# Patient Record
Sex: FEMALE | Race: WHITE | NOT HISPANIC OR LATINO | Employment: OTHER | URBAN - METROPOLITAN AREA
[De-identification: names, ages, dates, MRNs, and addresses within clinical notes are randomized per-mention and may not be internally consistent; named-entity substitution may affect disease eponyms.]

---

## 2017-01-27 ENCOUNTER — HOSPITAL ENCOUNTER (OUTPATIENT)
Dept: RADIOLOGY | Facility: MEDICAL CENTER | Age: 47
End: 2017-01-27
Attending: NURSE PRACTITIONER
Payer: COMMERCIAL

## 2017-01-27 DIAGNOSIS — Z12.31 ENCOUNTER FOR SCREENING MAMMOGRAM FOR BREAST CANCER: ICD-10-CM

## 2017-01-27 PROCEDURE — 77063 BREAST TOMOSYNTHESIS BI: CPT

## 2017-02-21 RX ORDER — TRAZODONE HYDROCHLORIDE 50 MG/1
TABLET ORAL
Qty: 30 TAB | Refills: 2 | Status: SHIPPED | OUTPATIENT
Start: 2017-02-21 | End: 2018-04-24

## 2017-07-10 DIAGNOSIS — R00.2 HEART PALPITATIONS: ICD-10-CM

## 2017-07-12 ENCOUNTER — HOSPITAL ENCOUNTER (OUTPATIENT)
Dept: LAB | Facility: MEDICAL CENTER | Age: 47
End: 2017-07-12
Attending: NURSE PRACTITIONER
Payer: COMMERCIAL

## 2017-07-12 DIAGNOSIS — R00.2 HEART PALPITATIONS: ICD-10-CM

## 2017-07-12 DIAGNOSIS — Z86.39 HISTORY OF IRON DEFICIENCY: ICD-10-CM

## 2017-07-12 DIAGNOSIS — E78.49 OTHER HYPERLIPIDEMIA: ICD-10-CM

## 2017-07-12 LAB
ANION GAP SERPL CALC-SCNC: 7 MMOL/L (ref 0–11.9)
BASOPHILS # BLD AUTO: 0.8 % (ref 0–1.8)
BASOPHILS # BLD: 0.04 K/UL (ref 0–0.12)
BUN SERPL-MCNC: 19 MG/DL (ref 8–22)
CALCIUM SERPL-MCNC: 9.1 MG/DL (ref 8.5–10.5)
CHLORIDE SERPL-SCNC: 107 MMOL/L (ref 96–112)
CHOLEST SERPL-MCNC: 201 MG/DL (ref 100–199)
CO2 SERPL-SCNC: 24 MMOL/L (ref 20–33)
CREAT SERPL-MCNC: 0.82 MG/DL (ref 0.5–1.4)
EOSINOPHIL # BLD AUTO: 0.13 K/UL (ref 0–0.51)
EOSINOPHIL NFR BLD: 2.5 % (ref 0–6.9)
ERYTHROCYTE [DISTWIDTH] IN BLOOD BY AUTOMATED COUNT: 43.2 FL (ref 35.9–50)
GFR SERPL CREATININE-BSD FRML MDRD: >60 ML/MIN/1.73 M 2
GLUCOSE SERPL-MCNC: 81 MG/DL (ref 65–99)
HCT VFR BLD AUTO: 42.9 % (ref 37–47)
HDLC SERPL-MCNC: 71 MG/DL
HGB BLD-MCNC: 13.9 G/DL (ref 12–16)
IMM GRANULOCYTES # BLD AUTO: 0.01 K/UL (ref 0–0.11)
IMM GRANULOCYTES NFR BLD AUTO: 0.2 % (ref 0–0.9)
IRON SATN MFR SERPL: 22 % (ref 15–55)
IRON SERPL-MCNC: 96 UG/DL (ref 40–170)
LDLC SERPL CALC-MCNC: 111 MG/DL
LYMPHOCYTES # BLD AUTO: 1.76 K/UL (ref 1–4.8)
LYMPHOCYTES NFR BLD: 34.5 % (ref 22–41)
MCH RBC QN AUTO: 29.5 PG (ref 27–33)
MCHC RBC AUTO-ENTMCNC: 32.4 G/DL (ref 33.6–35)
MCV RBC AUTO: 91.1 FL (ref 81.4–97.8)
MONOCYTES # BLD AUTO: 0.4 K/UL (ref 0–0.85)
MONOCYTES NFR BLD AUTO: 7.8 % (ref 0–13.4)
NEUTROPHILS # BLD AUTO: 2.76 K/UL (ref 2–7.15)
NEUTROPHILS NFR BLD: 54.2 % (ref 44–72)
NRBC # BLD AUTO: 0 K/UL
NRBC BLD AUTO-RTO: 0 /100 WBC
PLATELET # BLD AUTO: 174 K/UL (ref 164–446)
PMV BLD AUTO: 12.4 FL (ref 9–12.9)
POTASSIUM SERPL-SCNC: 4.3 MMOL/L (ref 3.6–5.5)
RBC # BLD AUTO: 4.71 M/UL (ref 4.2–5.4)
SODIUM SERPL-SCNC: 138 MMOL/L (ref 135–145)
TIBC SERPL-MCNC: 440 UG/DL (ref 250–450)
TRIGL SERPL-MCNC: 93 MG/DL (ref 0–149)
WBC # BLD AUTO: 5.1 K/UL (ref 4.8–10.8)

## 2017-07-12 PROCEDURE — 85025 COMPLETE CBC W/AUTO DIFF WBC: CPT

## 2017-07-12 PROCEDURE — 83540 ASSAY OF IRON: CPT

## 2017-07-12 PROCEDURE — 83550 IRON BINDING TEST: CPT

## 2017-07-12 PROCEDURE — 80061 LIPID PANEL: CPT

## 2017-07-12 PROCEDURE — 80048 BASIC METABOLIC PNL TOTAL CA: CPT

## 2017-07-12 PROCEDURE — 36415 COLL VENOUS BLD VENIPUNCTURE: CPT

## 2017-07-17 RX ORDER — ALPRAZOLAM 0.25 MG/1
TABLET ORAL
Qty: 30 TAB | Refills: 0 | Status: SHIPPED
Start: 2017-07-17 | End: 2018-01-30 | Stop reason: SDUPTHER

## 2017-07-17 NOTE — TELEPHONE ENCOUNTER
Was the patient seen in the last year in this department? Yes  refill 2/17/17    Does patient have an active prescription for medications requested? No     Received Request Via: Pharmacy

## 2017-07-25 ENCOUNTER — TELEPHONE (OUTPATIENT)
Dept: MEDICAL GROUP | Facility: LAB | Age: 47
End: 2017-07-25

## 2017-07-25 NOTE — TELEPHONE ENCOUNTER
ESTABLISHED PATIENT PRE-VISIT PLANNING     Note: Patient will not be contacted if there is no indication to call.     1.  Reviewed notes from the last few office visits within the medical group: Yes.  Last ov on 12/28/16 for wellness exam.  Saw Dr. Gale at [facility] on 12/29/16 for audiology evaluation. Had ortho consult with Dr. Ga at [facility] on 1/14/15 for hip pain.      2.  If any orders were placed at last visit or intended to be done for this visit (i.e. 6 mos follow-up), do we have Results/Consult Notes?        •  Labs - Labs ordered, completed and results are in chart.  Completed on 7/12/17       •  Imaging - Imaging ordered, completed and results are in chart.  Mammogram done on 1/27/17.         •  Referrals - Referral ordered, patient was seen and consult notes are in chart. Care Teams updated  YES.    3. Is this appointment scheduled as a Hospital Follow-Up? No    4.  Immunizations were updated in [facility] using WebIZ?: Yes       •  Web Iz Recommendations: HEPATITIS A , MMR , TD and VARICELLA (Chicken Pox)     5.  Patient is due for the following Health Maintenance Topics:   There are no preventive care reminders to display for this patient.    - Patient has completed TDAP Immunization(s) per WebIZ. Chart has been updated.    6.  Patient was NOT informed to arrive 15 min prior to their scheduled appointment and bring in their medication bottles.

## 2017-07-26 ENCOUNTER — OFFICE VISIT (OUTPATIENT)
Dept: MEDICAL GROUP | Facility: LAB | Age: 47
End: 2017-07-26
Payer: COMMERCIAL

## 2017-07-26 ENCOUNTER — HOSPITAL ENCOUNTER (OUTPATIENT)
Dept: RADIOLOGY | Facility: MEDICAL CENTER | Age: 47
End: 2017-07-26
Attending: NURSE PRACTITIONER
Payer: COMMERCIAL

## 2017-07-26 VITALS
TEMPERATURE: 98.4 F | RESPIRATION RATE: 12 BRPM | BODY MASS INDEX: 21.89 KG/M2 | DIASTOLIC BLOOD PRESSURE: 84 MMHG | HEIGHT: 66 IN | SYSTOLIC BLOOD PRESSURE: 116 MMHG | WEIGHT: 136.2 LBS | OXYGEN SATURATION: 54 % | HEART RATE: 99 BPM

## 2017-07-26 DIAGNOSIS — M54.6 THORACIC SPINE PAIN: ICD-10-CM

## 2017-07-26 DIAGNOSIS — R01.1 HEART MURMUR: ICD-10-CM

## 2017-07-26 DIAGNOSIS — R00.2 HEART PALPITATIONS: ICD-10-CM

## 2017-07-26 DIAGNOSIS — M50.30 DDD (DEGENERATIVE DISC DISEASE), CERVICAL: ICD-10-CM

## 2017-07-26 PROCEDURE — 99214 OFFICE O/P EST MOD 30 MIN: CPT | Performed by: NURSE PRACTITIONER

## 2017-07-26 PROCEDURE — 72070 X-RAY EXAM THORAC SPINE 2VWS: CPT

## 2017-07-26 NOTE — MR AVS SNAPSHOT
"        Kary Ziegler   2017 10:00 AM   Office Visit   MRN: [id number]    Department:  [facility]   Dept Phone:  [phone]    Description:  Female : 1970   Provider:  TANK Venegas           Reason for Visit     Results lab work     Irregular Heart Beat pt states she had 4 episodes of heart palpitaions that last about 15 minutes that caused her to have a cough, she has some left-sided back pain, onset 1 week       Allergies as of 2017     No Known Allergies      You were diagnosed with     Thoracic spine pain   [310764]       DDD (degenerative disc disease), cervical   [872401]       Heart palpitations   [237489]         Vital Signs     Blood Pressure Pulse Temperature Respirations Height Weight    116/84 mmHg 99 36.9 °C (98.4 °F) 12 1.676 m (5' 5.98\") 61.78 kg (136 lb 3.2 oz)    Body Mass Index Oxygen Saturation Smoking Status             21.99 kg/m2 54% Never Smoker          Basic Information     Date Of Birth Sex Race Ethnicity Preferred Language    1970 Female White Non- English      Problem List              ICD-10-CM Priority Class Noted - Resolved    Sleep disturbance G47.9   2014 - Present    Seasonal allergies J30.2   1/15/2015 - Present    Right hip pain M25.551   1/15/2015 - Present    DDD (degenerative disc disease), cervical M50.30   2016 - Present      Health Maintenance        Date Due Completion Dates    IMM INFLUENZA (1) 2016    MAMMOGRAM 2018, 2015, 2014, 2013, 2012    PAP SMEAR 2018, 2015 (Done)    Override on 2015: Done    IMM DTaP/Tdap/Td Vaccine (2 - Td) 2026            Current Immunizations     Influenza Vaccine Quad Inj (Pf) 2016  4:34 PM    Tdap Vaccine 2016  8:48 AM      Below and/or attached are the medications your provider expects you to take. Review all of your home medications and newly ordered medications with your " provider and/or pharmacist. Follow medication instructions as directed by your provider and/or pharmacist. Please keep your medication list with you and share with your provider. Update the information when medications are discontinued, doses are changed, or new medications (including over-the-counter products) are added; and carry medication information at all times in the event of emergency situations     Allergies:  No Known Allergies          Medications  Valid as of: July 26, 2017 - 10:33 AM    Generic Name Brand Name Tablet Size Instructions for use    Albuterol Sulfate (Aero Soln) albuterol 108 (90 BASE) MCG/ACT Inhale 1-2 Puffs by mouth every four hours as needed for Shortness of Breath.        ALPRAZolam (Tab) XANAX 0.25 MG TAKE ONE-HALF TABLET ORALLY AT BEDTIME AS NEEDED        Calcium Carbonate Antacid (Chew Tab) TUMS 500 MG Take 500 mg by mouth every day.          Cholecalciferol (Tab) cholecalciferol 1000 UNIT Take 4,000 Units by mouth every day.          Cyanocobalamin (Tab) VITAMIN B-12 100 MCG Take 100 mcg by mouth every day.          Fluticasone Propionate HFA (Aerosol) FLOVENT  MCG/ACT Inhale 2 Puffs by mouth 2 times a day.        Montelukast Sodium (Tab) SINGULAIR 10 MG Take 1 Tab by mouth every day.        TraZODone HCl (Tab) DESYREL 50 MG TAKE 1 TAB BY MOUTH EVERY EVENING.        TraZODone HCl (Tab) DESYREL 50 MG TAKE 1 TAB BY MOUTH EVERY EVENING.        .                 Medicines prescribed today were sent to:     [facility] #[id number] - [place] - [address]    [address] [address]    Phone: [phone] Fax: [phone]    Open 24 Hours?: No      Medication refill instructions:       If your prescription bottle indicates you have medication refills left, it is not necessary to call your provider’s office. Please contact your pharmacy and they will refill your medication.    If your prescription bottle indicates you do not have any refills left, you may request  refills at any time through one of the following ways: The online [employer] system (except Urgent Care), by calling your provider’s office, or by asking your pharmacy to contact your provider’s office with a refill request. Medication refills are processed only during regular business hours and may not be available until the next business day. Your provider may request additional information or to have a follow-up visit with you prior to refilling your medication.   *Please Note: Medication refills are assigned a new Rx number when refilled electronically. Your pharmacy may indicate that no refills were authorized even though a new prescription for the same medication is available at the pharmacy. Please request the medicine by name with the pharmacy before contacting your provider for a refill.        Your To Do List     Future Labs/Procedures Complete By Expires    DX-THORACIC SPINE-2 VIEWS  As directed 7/26/2018      Referral     A referral request has been sent to our patient care coordination department. Please allow 3-5 business days for us to process this request and contact you either by phone or mail. If you do not hear from us by the 5th business day, please call us at [phone].           [employer] Access Code: Activation code not generated  Current [employer] Status: Active

## 2017-07-26 NOTE — PROGRESS NOTES
"Chief Complaint   Patient presents with   • Results     lab work    • Irregular Heart Beat     pt states she had 4 episodes of heart palpitaions that last about 15 minutes that caused her to have a cough, she has some left-sided back pain, onset 1 week        HPI  Kary is a 45 yo est female here with f/u:   #1- heart palpitations:  Hx of same several years ago.  C/o 4 episodes of 15 minutes of heart palpitations about 1.5 weeks ago, over the course of a week.  Was not taking any sudafed but was drinking one extra cup of coffee per day and stressed about family coming to town.  No CP, constipation, n/v/d or dizziness.  Energy \"same,\" for years per pt.  Not bruising easily.  Had stress test and holter monitor in [place] 8 yrs ago and told that heart was healthy.      #2- Hyperlipidemia:  LDL decreased 40 pts with more fiber and nuts - would like to review most recent lipid panel.      #3-chronic neck pain with known cervical degenerative disc disease: She would like a referral back to her physical therapist which is typically helpful. She did see a physiatrist but was not interested in injections. She is concerned because she has been having mid back pain, described as achy and stiff. Denies any specific back injuries. Denies radiating pain down her legs. Prefers not to take medications for her pain.    #4-she was told in the past that she had a heart murmur. She reports that she's been told she has a heart murmur since she was a child. She has never had an echocardiogram that she can recall. She reports a very good exercise tolerance, stating that she can ride her bike, climb stairs and hiked for exercise without chest pain or abnormal shortness of breath.      Past medical, surgical, family, and social history is reviewed and updated in Epic chart by me today.   Medications and allergies reviewed and updated in Epic chart by me today.     ROS:   As documented in history of present illness above    Exam:  Blood " "pressure 116/84, pulse 99, temperature 36.9 °C (98.4 °F), resp. rate 12, height 1.676 m (5' 5.98\"), weight 61.78 kg (136 lb 3.2 oz), SpO2 54 %.  Constitutional: Alert, no distress, plus 3 vital signs  Skin:  Warm, dry, no rashes invisible areas  Eye: Equal, round and reactive, conjunctiva clear  ENMT: Lips without lesions, good dentition, oropharynx clear    Neck: Trachea midline, no masses, no thyromegaly  Respiratory: Unlabored respiration, lungs clear to auscultation, no wheezes, no rhonchi  Cardiovascular: Normal rate and rhythm with occasional extrasystole, 2 heard with in 25 beats. No specific murmur heard today.  Abdomen: Soft, nontender, no masses or hepatosplenomegaly  Psych: Alert, pleasant, well-groomed, normal affect    A/P:  1. Thoracic spine pain  -Recommend starting an x-ray and moving forward with physical therapy. She prefers to refrain from physiatry at this time.  - DX-THORACIC SPINE-2 VIEWS; Future  - REFERRAL TO PHYSICAL THERAPY Reason for Therapy: Eval/Treat/Report    2. DDD (degenerative disc disease), cervical  -As above  - DX-THORACIC SPINE-2 VIEWS; Future  - REFERRAL TO PHYSICAL THERAPY Reason for Therapy: Eval/Treat/Report    3. Heart palpitations  -Discussed workup such as Holter monitor, cardiology consult, echocardiogram. Requested records from her previous primary care. She is agreeable to an echocardiogram but at this point would like to hold off on further testing as she has not felt any heart palpitation in a week and a half. Encouraged her to refrain from decongestants, caffeine and any other type of stimulants. Follow-up with me for her palpitations return.    4. Heart murmur  -Recommend echocardiogram because of her history of heart murmur, as well as a cough induced by her heart palpitations and her heart palpitations in general.  - ECHOCARDIOGRAM COMP W/O CONT; Future      "

## 2017-07-26 NOTE — Clinical Note
[facility]  KARTHIK VenegasP.BUSTER.  [address]  [place] [address]  Fax: [phone]   Authorization for Release/Disclosure of   Protected Health Information   Name: CLOVIS BARONE : 1970 SSN: [id number]   Address: [address] [place] [address]   [place] [address] Phone:    [phone] (home)    I authorize the entity listed below to release/disclose the PHI below to:   [facility]/TANK Venegas and TANK Venegas   Provider or Entity Name:  Dr. Yolie Maddox in [place], [place]   Address   City, [facility], [facility]   Phone:      Fax:     Reason for request: continuity of care   Information to be released:    [  ] LAST COLONOSCOPY,  including any PATH REPORT and follow-up  [  ] LAST FIT/COLOGUARD RESULT [  ] LAST DEXA  [  ] LAST MAMMOGRAM  [  ] LAST PAP  [  ] LAST LABS [  ] RETINA EXAM REPORT  [  ] IMMUNIZATION RECORDS  [ x ] ekg, holter monitor, echo, stress test results.      [  ] Check here and initial the line next to each item to release ALL health information INCLUDING  _____ Care and treatment for drug and / or alcohol abuse  _____ HIV testing, infection status, or AIDS  _____ Genetic Testing    DATES OF SERVICE OR TIME PERIOD TO BE DISCLOSED: _____________  I understand and acknowledge that:  * This Authorization may be revoked at any time by you in writing, except if your health information has already been used or disclosed.  * Your health information that will be used or disclosed as a result of you signing this authorization could be re-disclosed by the recipient. If this occurs, your re-disclosed health information may no longer be protected by State or Federal laws.  * You may refuse to sign this Authorization. Your refusal will not affect your ability to obtain treatment.  * This Authorization becomes effective upon signing and will  on (date) __________.      If no date is indicated, this Authorization will  one (1) year from the signature date.     Name: Kary Ziegler    Signature:   Date:     7/26/2017       PLEASE FAX REQUESTED RECORDS BACK TO: [phone]

## 2017-09-06 ENCOUNTER — HOSPITAL ENCOUNTER (OUTPATIENT)
Dept: CARDIOLOGY | Facility: MEDICAL CENTER | Age: 47
End: 2017-09-06
Attending: NURSE PRACTITIONER
Payer: COMMERCIAL

## 2017-09-06 DIAGNOSIS — R01.1 HEART MURMUR: ICD-10-CM

## 2017-09-06 LAB
LV EJECT FRACT  99904: 70
LV EJECT FRACT MOD 2C 99903: 74
LV EJECT FRACT MOD 4C 99902: 71.37
LV EJECT FRACT MOD BP 99901: 73.38

## 2017-09-06 PROCEDURE — 93306 TTE W/DOPPLER COMPLETE: CPT

## 2017-09-06 PROCEDURE — 93306 TTE W/DOPPLER COMPLETE: CPT | Mod: 26 | Performed by: INTERNAL MEDICINE

## 2017-10-17 ENCOUNTER — TELEPHONE (OUTPATIENT)
Dept: MEDICAL GROUP | Facility: LAB | Age: 47
End: 2017-10-17

## 2017-10-17 NOTE — TELEPHONE ENCOUNTER
Will you ask if she has been around anyone with similar symptoms - may just be a virus that could pass within a few hours.  Ok to try dramamine and immodium if she can keep these down.  If she has intense abdominal pain and fever greater than 102, yes, UC or ER.

## 2017-10-17 NOTE — TELEPHONE ENCOUNTER
Patient's  call stating that patient has had diarrhea and vomiting every 20 minutes since 5 this morning. Patient's  is worried and would like to know if he should take her to an urgent care.

## 2018-01-23 ENCOUNTER — APPOINTMENT (OUTPATIENT)
Dept: RADIOLOGY | Facility: MEDICAL CENTER | Age: 48
End: 2018-01-23
Attending: NURSE PRACTITIONER
Payer: COMMERCIAL

## 2018-01-24 ENCOUNTER — NON-PROVIDER VISIT (OUTPATIENT)
Dept: MEDICAL GROUP | Facility: LAB | Age: 48
End: 2018-01-24
Payer: COMMERCIAL

## 2018-01-24 DIAGNOSIS — Z23 NEED FOR INFLUENZA VACCINATION: ICD-10-CM

## 2018-01-24 PROCEDURE — 90686 IIV4 VACC NO PRSV 0.5 ML IM: CPT | Performed by: NURSE PRACTITIONER

## 2018-01-24 PROCEDURE — 90471 IMMUNIZATION ADMIN: CPT | Performed by: NURSE PRACTITIONER

## 2018-01-25 NOTE — PROGRESS NOTES
"Kary Ziegler is a 47 y.o. female here for a non-provider visit for:   FLU    Reason for immunization: Annual Flu Vaccine  Immunization records indicate need for vaccine: Yes, confirmed with Epic  Minimum interval has been met for this vaccine: Yes  ABN completed: Not Indicated    Order and dose verified by: DHEERAJ  VIS Dated  8/7/15 was given to patient: Yes  All IAC Questionnaire questions were answered \"No.\"    Patient tolerated injection and no adverse effects were observed or reported: No    Pt scheduled for next dose in series: Not Indicated    "

## 2018-01-25 NOTE — PROGRESS NOTES
I have placed the below orders and discussed them with Dr. Matthews. the MA is performing the below orders under the direction of Dr. IBRAHIM

## 2018-01-30 DIAGNOSIS — F41.1 GAD (GENERALIZED ANXIETY DISORDER): ICD-10-CM

## 2018-01-30 RX ORDER — ALPRAZOLAM 0.25 MG/1
0.25 TABLET ORAL 3 TIMES DAILY PRN
Qty: 30 TAB | Refills: 0 | Status: SHIPPED
Start: 2018-01-30 | End: 2018-02-06 | Stop reason: SDUPTHER

## 2018-01-30 NOTE — TELEPHONE ENCOUNTER
Was the patient seen in the last year in this department? Yes  11/5/17 #15    Does patient have an active prescription for medications requested? No     Received Request Via: Pharmacy     - 7/26/17

## 2018-02-02 ENCOUNTER — HOSPITAL ENCOUNTER (OUTPATIENT)
Dept: RADIOLOGY | Facility: MEDICAL CENTER | Age: 48
End: 2018-02-02
Attending: NURSE PRACTITIONER
Payer: COMMERCIAL

## 2018-02-02 DIAGNOSIS — Z12.31 SCREENING MAMMOGRAM, ENCOUNTER FOR: ICD-10-CM

## 2018-02-02 PROCEDURE — 77063 BREAST TOMOSYNTHESIS BI: CPT

## 2018-02-06 DIAGNOSIS — F41.1 GAD (GENERALIZED ANXIETY DISORDER): ICD-10-CM

## 2018-02-06 RX ORDER — ALPRAZOLAM 0.25 MG/1
0.25 TABLET ORAL 3 TIMES DAILY PRN
Qty: 30 TAB | Refills: 0 | Status: SHIPPED
Start: 2018-02-06 | End: 2018-06-05 | Stop reason: SDUPTHER

## 2018-02-06 NOTE — TELEPHONE ENCOUNTER
Was the patient seen in the last year in this department? Yes 11/5/17 # 15    Does patient have an active prescription for medications requested? No     Received Request Via: Pharmacy     - 7/26/17

## 2018-02-12 DIAGNOSIS — E78.00 ELEVATED LDL CHOLESTEROL LEVEL: ICD-10-CM

## 2018-02-12 DIAGNOSIS — E61.1 IRON DEFICIENCY: ICD-10-CM

## 2018-02-19 ENCOUNTER — HOSPITAL ENCOUNTER (OUTPATIENT)
Dept: LAB | Facility: MEDICAL CENTER | Age: 48
End: 2018-02-19
Attending: NURSE PRACTITIONER
Payer: COMMERCIAL

## 2018-02-19 DIAGNOSIS — E61.1 IRON DEFICIENCY: ICD-10-CM

## 2018-02-19 DIAGNOSIS — E78.00 ELEVATED LDL CHOLESTEROL LEVEL: ICD-10-CM

## 2018-02-19 LAB
BASOPHILS # BLD AUTO: 0.8 % (ref 0–1.8)
BASOPHILS # BLD: 0.05 K/UL (ref 0–0.12)
CHOLEST SERPL-MCNC: 187 MG/DL (ref 100–199)
EOSINOPHIL # BLD AUTO: 0.08 K/UL (ref 0–0.51)
EOSINOPHIL NFR BLD: 1.4 % (ref 0–6.9)
ERYTHROCYTE [DISTWIDTH] IN BLOOD BY AUTOMATED COUNT: 41.3 FL (ref 35.9–50)
HCT VFR BLD AUTO: 43.5 % (ref 37–47)
HDLC SERPL-MCNC: 72 MG/DL
HGB BLD-MCNC: 14.6 G/DL (ref 12–16)
IMM GRANULOCYTES # BLD AUTO: 0.01 K/UL (ref 0–0.11)
IMM GRANULOCYTES NFR BLD AUTO: 0.2 % (ref 0–0.9)
IRON SATN MFR SERPL: 27 % (ref 15–55)
IRON SERPL-MCNC: 114 UG/DL (ref 40–170)
LDLC SERPL CALC-MCNC: 104 MG/DL
LYMPHOCYTES # BLD AUTO: 2.01 K/UL (ref 1–4.8)
LYMPHOCYTES NFR BLD: 34 % (ref 22–41)
MCH RBC QN AUTO: 30 PG (ref 27–33)
MCHC RBC AUTO-ENTMCNC: 33.6 G/DL (ref 33.6–35)
MCV RBC AUTO: 89.3 FL (ref 81.4–97.8)
MONOCYTES # BLD AUTO: 0.42 K/UL (ref 0–0.85)
MONOCYTES NFR BLD AUTO: 7.1 % (ref 0–13.4)
NEUTROPHILS # BLD AUTO: 3.34 K/UL (ref 2–7.15)
NEUTROPHILS NFR BLD: 56.5 % (ref 44–72)
NRBC # BLD AUTO: 0 K/UL
NRBC BLD-RTO: 0 /100 WBC
PLATELET # BLD AUTO: 162 K/UL (ref 164–446)
PMV BLD AUTO: 12.4 FL (ref 9–12.9)
RBC # BLD AUTO: 4.87 M/UL (ref 4.2–5.4)
TIBC SERPL-MCNC: 424 UG/DL (ref 250–450)
TRIGL SERPL-MCNC: 57 MG/DL (ref 0–149)
WBC # BLD AUTO: 5.9 K/UL (ref 4.8–10.8)

## 2018-02-19 PROCEDURE — 85025 COMPLETE CBC W/AUTO DIFF WBC: CPT

## 2018-02-19 PROCEDURE — 36415 COLL VENOUS BLD VENIPUNCTURE: CPT

## 2018-02-19 PROCEDURE — 83550 IRON BINDING TEST: CPT

## 2018-02-19 PROCEDURE — 80061 LIPID PANEL: CPT

## 2018-02-19 PROCEDURE — 83540 ASSAY OF IRON: CPT

## 2018-04-24 ENCOUNTER — OFFICE VISIT (OUTPATIENT)
Dept: MEDICAL GROUP | Facility: LAB | Age: 48
End: 2018-04-24
Payer: COMMERCIAL

## 2018-04-24 VITALS
DIASTOLIC BLOOD PRESSURE: 78 MMHG | TEMPERATURE: 98.5 F | HEIGHT: 66 IN | RESPIRATION RATE: 12 BRPM | OXYGEN SATURATION: 98 % | HEART RATE: 56 BPM | SYSTOLIC BLOOD PRESSURE: 110 MMHG | BODY MASS INDEX: 20.57 KG/M2 | WEIGHT: 128 LBS

## 2018-04-24 DIAGNOSIS — R09.82 POST-NASAL DRAINAGE: ICD-10-CM

## 2018-04-24 DIAGNOSIS — I70.90 ATHEROSCLEROSIS: ICD-10-CM

## 2018-04-24 DIAGNOSIS — R05.9 COUGH: ICD-10-CM

## 2018-04-24 DIAGNOSIS — G47.9 SLEEP DISTURBANCE: ICD-10-CM

## 2018-04-24 DIAGNOSIS — J45.20 MILD INTERMITTENT REACTIVE AIRWAY DISEASE WITHOUT COMPLICATION: ICD-10-CM

## 2018-04-24 PROBLEM — J45.909 REACTIVE AIRWAY DISEASE: Status: ACTIVE | Noted: 2018-04-24

## 2018-04-24 PROCEDURE — 99214 OFFICE O/P EST MOD 30 MIN: CPT | Performed by: NURSE PRACTITIONER

## 2018-04-24 RX ORDER — TEMAZEPAM 7.5 MG/1
7.5 CAPSULE ORAL NIGHTLY PRN
Qty: 30 CAP | Refills: 0 | Status: SHIPPED
Start: 2018-04-24 | End: 2019-02-19 | Stop reason: SDUPTHER

## 2018-04-24 ASSESSMENT — PATIENT HEALTH QUESTIONNAIRE - PHQ9: CLINICAL INTERPRETATION OF PHQ2 SCORE: 0

## 2018-04-24 NOTE — PROGRESS NOTES
Chief Complaint   Patient presents with   • Cough     pt states she has cough with green mucus, SOB, ear pain, onset 1 month    • Insomnia     pt is also having chronic issues with insomnia, she has tried meds before but nothing is helping    • Results     questions about previous echocardiogram        HPI   Kary is a 47-year-old established female here with several issues:  #1- she complains of one month of a productive morning cough, green mucus in the morning and PND in AM mainly.  Rest of day is fine with the exception of an occasional dry cough. Denies chest pain, shortness of breath on exertion or any other associated symptoms such as nasal congestion or facial itching. No fevers or chills.  #2-sleep disturbance:  Chronic issue for the patient for his long she can remember, worse and about 17 years ago when she had her son. Trouble falling and staying asleep. 0.25 mg xanax helps short-term to help her fall asleep but does not keep her asleep..  Tossing / turning.  Sounds wake her up easily.  Trazodone gives her nightmares, even at 1/4-1/2 of a 50 mg pill.  Not exercising regularly during the week.  Work is very stressful. Finds her moods are very poor throughout the day in terms of tearfulness and sadness. She is seeing a therapist. She denies suicidal or homicidal ideations.   #3- hx of hyperlipidemia: She is concerned about atherosclerosis noted on her thoracic spine x-ray. She has a remote family history of heart disease in her grandfather. She does not smoke. She notes that she has occasional chest twinges but has for decades, denies any major chest pain or heaviness.  Component      Latest Ref Rng & Units 12/23/2016 7/12/2017 2/19/2018           8:38 AM  9:13 AM  1:41 PM   Cholesterol,Tot      100 - 199 mg/dL 244 (H) 201 (H) 187   Triglycerides      0 - 149 mg/dL 52 93 57   HDL      >=40 mg/dL 81 71 72   LDL      <100 mg/dL 153 (H) 111 (H) 104 (H)   #4- RAD / allergies:  Followed by Dr. Reyes.  PFTs 2 x  "in the past 4 years - normal typically.  Coughs a lot in the morning and occasionally during the day - comes and goes.  Feels asthma is more cold and smoke inducing.     Family History   Problem Relation Age of Onset   • Cancer Mother      breast - dx 6/2012   • Other Mother      alopecia, vitiligo   • Depression Mother    • Anxiety disorder Mother    • Genitourinary () Mother      uterine fibroids - had hysterectomy   • Anxiety disorder Sister    • Cancer Maternal Grandmother      breast   • Heart Disease Maternal Grandfather    • Cancer Paternal Grandmother      lung - smoker         Past medical, surgical, family, and social history is reviewed and updated in Epic chart by me today.   Medications and allergies reviewed and updated in Epic chart by me today.     ROS:   As documented in history of present illness above    Exam:  Blood pressure 110/78, pulse (!) 56, temperature 36.9 °C (98.5 °F), resp. rate 12, height 1.676 m (5' 5.98\"), weight 58.1 kg (128 lb), SpO2 98 %.  Constitutional: Alert, no distress, plus 3 vital signs  Skin:  Warm, dry, no rashes invisible areas  Eye: Equal, round and reactive, conjunctiva clear  ENMT: Lips without lesions, good dentition, oropharynx clear    Neck: Trachea midline  Respiratory: Unlabored respiration, lungs clear to auscultation, no wheezes, no rhonchi  Cardiovascular: Normal rate and rhythm  Psych: Alert, pleasant, well-groomed, normal affect    A/P:  1. Post-nasal drainage  -Trial of generic Claritin and Flonase for 2 weeks, notify me if not improving and we may try antibiotics as well as imaging.    2. Cough  -As above    3. Sleep disturbance  -Trial of temazepam 7.5 mg immediately prior to bedtime. She understands not to mix temazepam with Xanax. Discussed length of onset of efficacy as well as potential side effects of temazepam. Discussed dependency and tolerance nature. She'll follow-up with me in 48-72 hours regarding how she sleeps when taking temazepam.  - " temazepam (RESTORIL) 7.5 MG capsule; Take 1 Cap by mouth at bedtime as needed for Sleep for up to 30 days.  Dispense: 30 Cap; Refill: 0    4. Atherosclerosis  -Reviewed her lipid panels from the past 2 years. Recommend continuation of a high-fiber diet and exercise. Considering atherosclerosis seen on her thoracic x-ray, recommend CT cardiac scoring.  - CT-CARDIAC SCORING; Future    5. Mild intermittent reactive airway disease without complication  -Stable. Has albuterol to utilize as needed. Not currently using Flovent.

## 2018-05-29 ENCOUNTER — HOSPITAL ENCOUNTER (OUTPATIENT)
Dept: RADIOLOGY | Facility: MEDICAL CENTER | Age: 48
End: 2018-05-29
Attending: NURSE PRACTITIONER
Payer: COMMERCIAL

## 2018-05-29 DIAGNOSIS — I70.90 ATHEROSCLEROSIS: ICD-10-CM

## 2018-05-29 PROCEDURE — 4410556 CT-CARDIAC SCORING

## 2018-06-05 ENCOUNTER — OFFICE VISIT (OUTPATIENT)
Dept: MEDICAL GROUP | Facility: LAB | Age: 48
End: 2018-06-05
Payer: COMMERCIAL

## 2018-06-05 ENCOUNTER — HOSPITAL ENCOUNTER (OUTPATIENT)
Facility: MEDICAL CENTER | Age: 48
End: 2018-06-05
Attending: NURSE PRACTITIONER
Payer: COMMERCIAL

## 2018-06-05 VITALS
SYSTOLIC BLOOD PRESSURE: 92 MMHG | RESPIRATION RATE: 12 BRPM | WEIGHT: 131 LBS | DIASTOLIC BLOOD PRESSURE: 68 MMHG | HEART RATE: 52 BPM | TEMPERATURE: 98.5 F | HEIGHT: 66 IN | OXYGEN SATURATION: 98 % | BODY MASS INDEX: 21.05 KG/M2

## 2018-06-05 DIAGNOSIS — F41.1 GAD (GENERALIZED ANXIETY DISORDER): ICD-10-CM

## 2018-06-05 DIAGNOSIS — Z01.419 ENCOUNTER FOR GYNECOLOGICAL EXAMINATION: ICD-10-CM

## 2018-06-05 DIAGNOSIS — Z12.4 SCREENING FOR MALIGNANT NEOPLASM OF CERVIX: ICD-10-CM

## 2018-06-05 PROCEDURE — 99396 PREV VISIT EST AGE 40-64: CPT | Performed by: NURSE PRACTITIONER

## 2018-06-05 PROCEDURE — 88175 CYTOPATH C/V AUTO FLUID REDO: CPT

## 2018-06-05 RX ORDER — ALPRAZOLAM 0.25 MG/1
0.25 TABLET ORAL 3 TIMES DAILY PRN
Qty: 30 TAB | Refills: 2 | Status: SHIPPED
Start: 2018-06-05 | End: 2019-02-19 | Stop reason: SDUPTHER

## 2018-06-05 NOTE — PROGRESS NOTES
Chief Complaint   Patient presents with   • Gynecologic Exam       HPI:  Kary is a 47 y.o.  female who presents for annual exam. Generally the patient is feeling good.   Regarding her health maintenance:   Last pap: 2015  Abnormal Pap hx: Never that she can recall  Periods: nothing in 2 months.  erratic x 2 yrs.    Contraception:  nothing  Last Mammo: Completed in February and normal  Last colonoscopy:not applicable   Bone density test:N\A   Last Lab: Current  Last Td:current   Influenza vaccination:current   Pneumococcal vaccination:not applicable   Zostavax:not applicable   Hx STD''s: no   Regular exercise: yes  Sleeping ok with temazepam, xanax and trazodone.      meds:   Current Outpatient Prescriptions   Medication Sig Dispense Refill   • montelukast (SINGULAIR) 10 MG TABS Take 1 Tab by mouth every day. 30 Tab 11   • fluticasone (FLOVENT HFA) 110 MCG/ACT AERO Inhale 2 Puffs by mouth 2 times a day. 1 Inhaler 3   • albuterol (PROAIR HFA) 108 (90 BASE) MCG/ACT AERS Inhale 1-2 Puffs by mouth every four hours as needed for Shortness of Breath. 1 Inhaler 2   • calcium carbonate (TUMS) 500 MG CHEW Take 500 mg by mouth every day.       • cyanocobalamin (VITAMIN B-12) 100 MCG TABS Take 100 mcg by mouth every day.       • vitamin D (CHOLECALCIFEROL) 1000 UNIT TABS Take 4,000 Units by mouth every day.         No current facility-administered medications for this visit.        Allergies: No Known Allergies    family:   Family History   Problem Relation Age of Onset   • Cancer Mother      breast - dx 6/2012   • Other Mother      alopecia, vitiligo   • Depression Mother    • Anxiety disorder Mother    • Genitourinary () Mother      uterine fibroids - had hysterectomy   • Anxiety disorder Sister    • Cancer Maternal Grandmother      breast   • Heart Disease Maternal Grandfather    • Cancer Paternal Grandmother      lung - smoker       social hx:   Social History     Social History   • Marital status:      Spouse  "name: N/A   • Number of children: N/A   • Years of education: N/A     Occupational History   • Not on file.     Social History Main Topics   • Smoking status: Never Smoker   • Smokeless tobacco: Never Used   • Alcohol use Yes      Comment: socially   • Drug use: No   • Sexual activity: Not on file      Comment: ; one child; [employer]     Other Topics Concern   • Not on file     Social History Narrative   • No narrative on file       ROS:  No fever, chills, sweats.   No polydipsia, polyuria, temperature intolerance, significant weight changes   No visual changes, blurred vision.  No chest pain, palpitations, peripheral swelling   No chronic cough, shortness of breath, dyspnea with exertion.   No dysphagia, odynophagia, black or bloody stools.   No abdominal pain, nausea, persistent diarrhea, constipation   No dysuria, hematuria, incontinence. Denies nocturia  No rash, pruritis, pigment changes.   No focal weakness, syncope, headache, confusion, persistent numbness.   All other systems are reviewed and negative.    PHYSICAL EXAMINATION:  Blood pressure (!) 92/68, pulse (!) 52, temperature 36.9 °C (98.5 °F), resp. rate 12, height 1.676 m (5' 5.98\"), weight 59.4 kg (131 lb), SpO2 98 %.  General appearance:healthy, well developed, well nourished  Psych: alert, no distress, cooperative  Eyes: EOM's normal, pupils equal, round, reactive to light  ENT: Ears: external ears normal to inspection and palpation, TM's clear, Nose/Sinuses: nose shows no deformity, asymmetry, or inflammation  Neck: no asymmetry, masses, or scars, no adenopathy, thyroid normal to palpation  Lungs:chest symmetric with normal A/P diameter, no chest deformities noted, normal respiratory rate and rhythm  Cardiovascular:regular rate and rhythm, S1 normal  Breasts: normal in size and symmetry, skin normal, physiologic fibronodularity  Abdomen: umbilicus normal, no masses palpable, no organomegaly  Musculoskeletal:ROM of all joints is normal, " no evidence of joint instability  Lymphatic: None significantly enlarged  Skin: no rash, no edema  Neuro: mental status intact, cranial nerves 2-12 intact  Pelvic: external genitalia normal, cervix normal in appearance, bimanual exam reveals normal uterus, adnexa without masses or tenderness, vaginal mucosa normal      ASSESSMENT/PLAN:  1.annual physical exam: HCM:  Pap and breast exams done.  BSE technique reviewed and patient encouraged to perform self-exam monthly.   Encourage monthly self breast exam  Encourage daily exercise for at least 30 minutes  Recommend 1500 mg Calcium with 600 units vit d daily.    Pap q 3 yrs if todays is normal.   Labs UTD  Mammogram is up-to-date.  Considering her body type and perimenopausal state, recommend DEXA scan in the next 1-2 years.  Discussed the importance of weightbearing exercise for her bone health.  Reviewed her CT cardiac scoring which was excellent.  Renewed Xanax, she alternates Xanax, temazepam and trazodone.  Discussed the potential increased risk of chemical dependency and respiratory depression with benzodiazepines -she understands to avoid taking benzodiazepines with alcohol..

## 2018-06-06 DIAGNOSIS — Z12.4 SCREENING FOR MALIGNANT NEOPLASM OF CERVIX: ICD-10-CM

## 2018-06-06 LAB — CYTOLOGY REG CYTOL: NORMAL

## 2018-11-15 ENCOUNTER — TELEPHONE (OUTPATIENT)
Dept: MEDICAL GROUP | Facility: LAB | Age: 48
End: 2018-11-15

## 2018-11-15 DIAGNOSIS — Z23 NEED FOR VACCINATION: Primary | ICD-10-CM

## 2018-11-15 NOTE — TELEPHONE ENCOUNTER
1. Caller Name: Kary                                    Patient is on the MA Schedule tomorrow for flu vaccine/injection.    SPECIFIC Action To Be Taken: Orders pending, please sign.

## 2018-11-16 ENCOUNTER — NON-PROVIDER VISIT (OUTPATIENT)
Dept: MEDICAL GROUP | Facility: LAB | Age: 48
End: 2018-11-16
Payer: COMMERCIAL

## 2018-11-16 DIAGNOSIS — Z23 NEED FOR VACCINATION: ICD-10-CM

## 2018-11-16 PROCEDURE — 90471 IMMUNIZATION ADMIN: CPT | Performed by: INTERNAL MEDICINE

## 2018-11-16 PROCEDURE — 90686 IIV4 VACC NO PRSV 0.5 ML IM: CPT | Performed by: INTERNAL MEDICINE

## 2018-12-20 ENCOUNTER — OFFICE VISIT (OUTPATIENT)
Dept: MEDICAL GROUP | Facility: LAB | Age: 48
End: 2018-12-20
Payer: COMMERCIAL

## 2018-12-20 ENCOUNTER — HOSPITAL ENCOUNTER (OUTPATIENT)
Dept: LAB | Facility: MEDICAL CENTER | Age: 48
End: 2018-12-20
Attending: NURSE PRACTITIONER
Payer: COMMERCIAL

## 2018-12-20 VITALS
WEIGHT: 135 LBS | SYSTOLIC BLOOD PRESSURE: 110 MMHG | TEMPERATURE: 98.2 F | HEART RATE: 58 BPM | RESPIRATION RATE: 12 BRPM | HEIGHT: 66 IN | DIASTOLIC BLOOD PRESSURE: 70 MMHG | BODY MASS INDEX: 21.69 KG/M2 | OXYGEN SATURATION: 99 %

## 2018-12-20 DIAGNOSIS — R42 LIGHTHEADED: ICD-10-CM

## 2018-12-20 DIAGNOSIS — R42 DIZZINESS: ICD-10-CM

## 2018-12-20 DIAGNOSIS — R11.0 NAUSEA: ICD-10-CM

## 2018-12-20 DIAGNOSIS — E61.1 IRON DEFICIENCY: ICD-10-CM

## 2018-12-20 PROCEDURE — 99214 OFFICE O/P EST MOD 30 MIN: CPT | Performed by: NURSE PRACTITIONER

## 2018-12-20 PROCEDURE — 85025 COMPLETE CBC W/AUTO DIFF WBC: CPT

## 2018-12-20 PROCEDURE — 83550 IRON BINDING TEST: CPT

## 2018-12-20 PROCEDURE — 84443 ASSAY THYROID STIM HORMONE: CPT

## 2018-12-20 PROCEDURE — 82728 ASSAY OF FERRITIN: CPT

## 2018-12-20 PROCEDURE — 83540 ASSAY OF IRON: CPT

## 2018-12-20 PROCEDURE — 82306 VITAMIN D 25 HYDROXY: CPT

## 2018-12-20 PROCEDURE — 80053 COMPREHEN METABOLIC PANEL: CPT

## 2018-12-20 PROCEDURE — 84439 ASSAY OF FREE THYROXINE: CPT

## 2018-12-20 PROCEDURE — 36415 COLL VENOUS BLD VENIPUNCTURE: CPT

## 2018-12-20 NOTE — PROGRESS NOTES
"Chief Complaint   Patient presents with   • Dizziness     X 2 months/ up and down/ heart palpatations/ slight nausea       HPI  Kary is a 48-year-old established female here with complaint of new onset feeling fatigued, lightheaded, dizzy, light headaches, slight nausea, low energy, heart palpitations x the past 2 months at least.  She is concerned that her iron is low again, she has a vitamin D deficiency.  She is been off of all vitamins for the past couple of months.  Denies any injuries or traumas.    Appetite is good.  Sleep has improved without medication for the past few weeks.  Nausea is in the morning only without vomiting or diarrhea.  Fatigued in the morning more than normal.  Bowels are moving well.    Working out with  x 2 mo twice weekly.    Supplements: none x the past month.  Was taking prenatal with iron / vit C.  Menses most months and heavy.      Past medical, surgical, family, and social history is reviewed and updated in Epic chart by me today.   Medications and allergies reviewed and updated in Epic chart by me today.     ROS:   As documented in history of present illness above    Exam:  Blood pressure 110/70, pulse (!) 58, temperature 36.8 °C (98.2 °F), resp. rate 12, height 1.676 m (5' 6\"), weight 61.2 kg (135 lb), last menstrual period 2018, SpO2 99 %.  Constitutional: Alert, no distress, plus 3 vital signs  Skin:  Warm, dry, no rashes invisible areas  Eye: Equal, round and reactive, conjunctiva clear  ENMT: Lips without lesions, good dentition, oropharynx clear    Neck: Trachea midline, no masses, no thyromegaly  Respiratory: Unlabored respiration, lungs clear to auscultation, no wheezes, no rhonchi  Cardiovascular: Normal rate and rhythm, no murmur, no edema  Abdomen: Soft, nontender, no masses or hepatosplenomegaly  Psych: Alert, pleasant, well-groomed, normal affect    Assessment / Plan / Medical Decision makin. Dizziness  COMP METABOLIC PANEL    CBC WITH DIFFERENTIAL "    TSH    FREE THYROXINE    VITAMIN D,25 HYDROXY    IRON/TOTAL IRON BIND    FERRITIN   2. Lightheaded  COMP METABOLIC PANEL    CBC WITH DIFFERENTIAL    TSH    FREE THYROXINE    VITAMIN D,25 HYDROXY    IRON/TOTAL IRON BIND    FERRITIN   3. Nausea  COMP METABOLIC PANEL    CBC WITH DIFFERENTIAL    TSH    FREE THYROXINE    VITAMIN D,25 HYDROXY    IRON/TOTAL IRON BIND    FERRITIN   4. Iron deficiency  COMP METABOLIC PANEL    CBC WITH DIFFERENTIAL    TSH    FREE THYROXINE    VITAMIN D,25 HYDROXY    IRON/TOTAL IRON BIND    FERRITIN     Recommend starting with updated blood work, last labs were almost a year ago.  If labs are normal, recommend further testing with gallbladder ultrasound and CT of the head.  Reviewed her echocardiogram with her from last year as well as negative CT cardiac scoring.  Encouraged her to continue with exercise as tolerated high water intake, healthy eating, adequate sleep at night and avoiding driving when she is feeling lightheaded.

## 2018-12-21 LAB
25(OH)D3 SERPL-MCNC: 16 NG/ML (ref 30–100)
ALBUMIN SERPL BCP-MCNC: 4.2 G/DL (ref 3.2–4.9)
ALBUMIN/GLOB SERPL: 1.4 G/DL
ALP SERPL-CCNC: 40 U/L (ref 30–99)
ALT SERPL-CCNC: 49 U/L (ref 2–50)
ANION GAP SERPL CALC-SCNC: 6 MMOL/L (ref 0–11.9)
AST SERPL-CCNC: 143 U/L (ref 12–45)
BASOPHILS # BLD AUTO: 1 % (ref 0–1.8)
BASOPHILS # BLD: 0.05 K/UL (ref 0–0.12)
BILIRUB SERPL-MCNC: 0.6 MG/DL (ref 0.1–1.5)
BUN SERPL-MCNC: 16 MG/DL (ref 8–22)
CALCIUM SERPL-MCNC: 10.1 MG/DL (ref 8.5–10.5)
CHLORIDE SERPL-SCNC: 106 MMOL/L (ref 96–112)
CO2 SERPL-SCNC: 27 MMOL/L (ref 20–33)
CREAT SERPL-MCNC: 0.83 MG/DL (ref 0.5–1.4)
EOSINOPHIL # BLD AUTO: 0.12 K/UL (ref 0–0.51)
EOSINOPHIL NFR BLD: 2.4 % (ref 0–6.9)
ERYTHROCYTE [DISTWIDTH] IN BLOOD BY AUTOMATED COUNT: 40.9 FL (ref 35.9–50)
FERRITIN SERPL-MCNC: 18 NG/ML (ref 10–291)
GLOBULIN SER CALC-MCNC: 3.1 G/DL (ref 1.9–3.5)
GLUCOSE SERPL-MCNC: 75 MG/DL (ref 65–99)
HCT VFR BLD AUTO: 45.7 % (ref 37–47)
HGB BLD-MCNC: 15.1 G/DL (ref 12–16)
IMM GRANULOCYTES # BLD AUTO: 0.01 K/UL (ref 0–0.11)
IMM GRANULOCYTES NFR BLD AUTO: 0.2 % (ref 0–0.9)
IRON SATN MFR SERPL: 18 % (ref 15–55)
IRON SERPL-MCNC: 81 UG/DL (ref 40–170)
LYMPHOCYTES # BLD AUTO: 1.9 K/UL (ref 1–4.8)
LYMPHOCYTES NFR BLD: 37.7 % (ref 22–41)
MCH RBC QN AUTO: 29.6 PG (ref 27–33)
MCHC RBC AUTO-ENTMCNC: 33 G/DL (ref 33.6–35)
MCV RBC AUTO: 89.6 FL (ref 81.4–97.8)
MONOCYTES # BLD AUTO: 0.51 K/UL (ref 0–0.85)
MONOCYTES NFR BLD AUTO: 10.1 % (ref 0–13.4)
NEUTROPHILS # BLD AUTO: 2.45 K/UL (ref 2–7.15)
NEUTROPHILS NFR BLD: 48.6 % (ref 44–72)
NRBC # BLD AUTO: 0 K/UL
NRBC BLD-RTO: 0 /100 WBC
PLATELET # BLD AUTO: 189 K/UL (ref 164–446)
PMV BLD AUTO: 12.1 FL (ref 9–12.9)
POTASSIUM SERPL-SCNC: 4.9 MMOL/L (ref 3.6–5.5)
PROT SERPL-MCNC: 7.3 G/DL (ref 6–8.2)
RBC # BLD AUTO: 5.1 M/UL (ref 4.2–5.4)
SODIUM SERPL-SCNC: 139 MMOL/L (ref 135–145)
T4 FREE SERPL-MCNC: 1.01 NG/DL (ref 0.53–1.43)
TIBC SERPL-MCNC: 444 UG/DL (ref 250–450)
TSH SERPL DL<=0.005 MIU/L-ACNC: 1.93 UIU/ML (ref 0.38–5.33)
WBC # BLD AUTO: 5 K/UL (ref 4.8–10.8)

## 2018-12-24 DIAGNOSIS — E61.1 IRON DEFICIENCY: ICD-10-CM

## 2018-12-24 DIAGNOSIS — R79.89 ELEVATED LIVER FUNCTION TESTS: ICD-10-CM

## 2018-12-24 DIAGNOSIS — E55.9 VITAMIN D DEFICIENCY: ICD-10-CM

## 2018-12-25 DIAGNOSIS — E78.00 ELEVATED LDL CHOLESTEROL LEVEL: ICD-10-CM

## 2019-01-18 ENCOUNTER — APPOINTMENT (RX ONLY)
Dept: URBAN - METROPOLITAN AREA CLINIC 22 | Facility: CLINIC | Age: 49
Setting detail: DERMATOLOGY
End: 2019-01-18

## 2019-01-18 DIAGNOSIS — L82.1 OTHER SEBORRHEIC KERATOSIS: ICD-10-CM

## 2019-01-18 DIAGNOSIS — D18.0 HEMANGIOMA: ICD-10-CM

## 2019-01-18 DIAGNOSIS — L81.4 OTHER MELANIN HYPERPIGMENTATION: ICD-10-CM

## 2019-01-18 DIAGNOSIS — D22 MELANOCYTIC NEVI: ICD-10-CM

## 2019-01-18 DIAGNOSIS — L85.3 XEROSIS CUTIS: ICD-10-CM

## 2019-01-18 DIAGNOSIS — Z71.89 OTHER SPECIFIED COUNSELING: ICD-10-CM

## 2019-01-18 DIAGNOSIS — L98.8 OTHER SPECIFIED DISORDERS OF THE SKIN AND SUBCUTANEOUS TISSUE: ICD-10-CM

## 2019-01-18 PROBLEM — D22.5 MELANOCYTIC NEVI OF TRUNK: Status: ACTIVE | Noted: 2019-01-18

## 2019-01-18 PROBLEM — D18.01 HEMANGIOMA OF SKIN AND SUBCUTANEOUS TISSUE: Status: ACTIVE | Noted: 2019-01-18

## 2019-01-18 PROCEDURE — ? COUNSELING

## 2019-01-18 PROCEDURE — ? MEDICAL CONSULTATION: DYNAMIC RHYTIDES

## 2019-01-18 PROCEDURE — ? OBSERVATION AND MEASURE

## 2019-01-18 PROCEDURE — ? DEFER

## 2019-01-18 PROCEDURE — ? PHOTO-DOCUMENTATION

## 2019-01-18 PROCEDURE — 99203 OFFICE O/P NEW LOW 30 MIN: CPT

## 2019-01-18 ASSESSMENT — LOCATION SIMPLE DESCRIPTION DERM
LOCATION SIMPLE: LEFT FOREARM
LOCATION SIMPLE: RIGHT PRETIBIAL REGION
LOCATION SIMPLE: RIGHT FOREARM
LOCATION SIMPLE: LEFT LOWER BACK
LOCATION SIMPLE: UPPER BACK
LOCATION SIMPLE: LEFT PRETIBIAL REGION
LOCATION SIMPLE: INFERIOR FOREHEAD
LOCATION SIMPLE: CHEST

## 2019-01-18 ASSESSMENT — LOCATION DETAILED DESCRIPTION DERM
LOCATION DETAILED: LEFT PROXIMAL PRETIBIAL REGION
LOCATION DETAILED: LEFT VENTRAL PROXIMAL FOREARM
LOCATION DETAILED: LEFT SUPERIOR MEDIAL LOWER BACK
LOCATION DETAILED: INFERIOR MID FOREHEAD
LOCATION DETAILED: INFERIOR THORACIC SPINE
LOCATION DETAILED: LEFT LATERAL PROXIMAL PRETIBIAL REGION
LOCATION DETAILED: RIGHT VENTRAL PROXIMAL FOREARM
LOCATION DETAILED: LOWER STERNUM
LOCATION DETAILED: RIGHT PROXIMAL PRETIBIAL REGION
LOCATION DETAILED: SUPERIOR THORACIC SPINE

## 2019-01-18 ASSESSMENT — LOCATION ZONE DERM
LOCATION ZONE: LEG
LOCATION ZONE: FACE
LOCATION ZONE: TRUNK
LOCATION ZONE: ARM

## 2019-01-18 NOTE — PROCEDURE: DEFER
Detail Level: Detailed
Procedure To Be Performed At Next Visit: Botox
Instructions (Optional): Referred to Dr. Salgado

## 2019-02-04 ENCOUNTER — APPOINTMENT (OUTPATIENT)
Dept: RADIOLOGY | Facility: MEDICAL CENTER | Age: 49
End: 2019-02-04
Attending: NURSE PRACTITIONER
Payer: COMMERCIAL

## 2019-02-04 DIAGNOSIS — Z12.31 VISIT FOR SCREENING MAMMOGRAM: ICD-10-CM

## 2019-02-08 ENCOUNTER — HOSPITAL ENCOUNTER (OUTPATIENT)
Dept: LAB | Facility: MEDICAL CENTER | Age: 49
End: 2019-02-08
Attending: NURSE PRACTITIONER
Payer: COMMERCIAL

## 2019-02-08 DIAGNOSIS — E61.1 IRON DEFICIENCY: ICD-10-CM

## 2019-02-08 DIAGNOSIS — R79.89 ELEVATED LIVER FUNCTION TESTS: ICD-10-CM

## 2019-02-08 DIAGNOSIS — E55.9 VITAMIN D DEFICIENCY: ICD-10-CM

## 2019-02-08 LAB
25(OH)D3 SERPL-MCNC: 24 NG/ML (ref 30–100)
ALBUMIN SERPL BCP-MCNC: 4.1 G/DL (ref 3.2–4.9)
ALP SERPL-CCNC: 37 U/L (ref 30–99)
ALT SERPL-CCNC: 15 U/L (ref 2–50)
AST SERPL-CCNC: 18 U/L (ref 12–45)
BASOPHILS # BLD AUTO: 1 % (ref 0–1.8)
BASOPHILS # BLD: 0.05 K/UL (ref 0–0.12)
BILIRUB CONJ SERPL-MCNC: <0.1 MG/DL (ref 0.1–0.5)
BILIRUB INDIRECT SERPL-MCNC: NORMAL MG/DL (ref 0–1)
BILIRUB SERPL-MCNC: 0.3 MG/DL (ref 0.1–1.5)
EOSINOPHIL # BLD AUTO: 0.09 K/UL (ref 0–0.51)
EOSINOPHIL NFR BLD: 1.7 % (ref 0–6.9)
ERYTHROCYTE [DISTWIDTH] IN BLOOD BY AUTOMATED COUNT: 45.2 FL (ref 35.9–50)
HCT VFR BLD AUTO: 43.2 % (ref 37–47)
HGB BLD-MCNC: 14.3 G/DL (ref 12–16)
IMM GRANULOCYTES # BLD AUTO: 0.01 K/UL (ref 0–0.11)
IMM GRANULOCYTES NFR BLD AUTO: 0.2 % (ref 0–0.9)
IRON SATN MFR SERPL: 25 % (ref 15–55)
IRON SERPL-MCNC: 105 UG/DL (ref 40–170)
LYMPHOCYTES # BLD AUTO: 1.7 K/UL (ref 1–4.8)
LYMPHOCYTES NFR BLD: 32.4 % (ref 22–41)
MCH RBC QN AUTO: 30.7 PG (ref 27–33)
MCHC RBC AUTO-ENTMCNC: 33.1 G/DL (ref 33.6–35)
MCV RBC AUTO: 92.7 FL (ref 81.4–97.8)
MONOCYTES # BLD AUTO: 0.4 K/UL (ref 0–0.85)
MONOCYTES NFR BLD AUTO: 7.6 % (ref 0–13.4)
NEUTROPHILS # BLD AUTO: 2.99 K/UL (ref 2–7.15)
NEUTROPHILS NFR BLD: 57.1 % (ref 44–72)
NRBC # BLD AUTO: 0 K/UL
NRBC BLD-RTO: 0 /100 WBC
PLATELET # BLD AUTO: 184 K/UL (ref 164–446)
PMV BLD AUTO: 12.1 FL (ref 9–12.9)
PROT SERPL-MCNC: 7.3 G/DL (ref 6–8.2)
RBC # BLD AUTO: 4.66 M/UL (ref 4.2–5.4)
TIBC SERPL-MCNC: 424 UG/DL (ref 250–450)
WBC # BLD AUTO: 5.2 K/UL (ref 4.8–10.8)

## 2019-02-08 PROCEDURE — 80076 HEPATIC FUNCTION PANEL: CPT

## 2019-02-08 PROCEDURE — 85025 COMPLETE CBC W/AUTO DIFF WBC: CPT

## 2019-02-08 PROCEDURE — 82306 VITAMIN D 25 HYDROXY: CPT

## 2019-02-08 PROCEDURE — 36415 COLL VENOUS BLD VENIPUNCTURE: CPT

## 2019-02-08 PROCEDURE — 83550 IRON BINDING TEST: CPT

## 2019-02-08 PROCEDURE — 83540 ASSAY OF IRON: CPT

## 2019-02-12 ENCOUNTER — HOSPITAL ENCOUNTER (OUTPATIENT)
Dept: RADIOLOGY | Facility: MEDICAL CENTER | Age: 49
End: 2019-02-12
Attending: NURSE PRACTITIONER
Payer: COMMERCIAL

## 2019-02-12 DIAGNOSIS — Z12.31 VISIT FOR SCREENING MAMMOGRAM: ICD-10-CM

## 2019-02-12 PROCEDURE — 77063 BREAST TOMOSYNTHESIS BI: CPT

## 2019-02-18 RX ORDER — ERGOCALCIFEROL 1.25 MG/1
50000 CAPSULE ORAL
Qty: 4 CAP | Refills: 5 | Status: SHIPPED | OUTPATIENT
Start: 2019-02-18 | End: 2019-08-11 | Stop reason: SDUPTHER

## 2019-02-19 DIAGNOSIS — F41.1 GAD (GENERALIZED ANXIETY DISORDER): ICD-10-CM

## 2019-02-19 DIAGNOSIS — G47.9 SLEEP DISTURBANCE: ICD-10-CM

## 2019-02-19 RX ORDER — TEMAZEPAM 7.5 MG/1
7.5 CAPSULE ORAL NIGHTLY PRN
Qty: 30 CAP | Refills: 0 | Status: SHIPPED | OUTPATIENT
Start: 2019-02-19 | End: 2019-03-21

## 2019-02-19 RX ORDER — ALPRAZOLAM 0.25 MG/1
0.25 TABLET ORAL 3 TIMES DAILY PRN
Qty: 30 TAB | Refills: 2 | Status: SHIPPED | OUTPATIENT
Start: 2019-02-19 | End: 2019-12-02 | Stop reason: SDUPTHER

## 2019-02-19 NOTE — TELEPHONE ENCOUNTER
Was the patient seen in the last year in this department? Yes LOV: 12/20/18     Does patient have an active prescription for medications requested? No     Received Request Via: Pharmacy

## 2019-05-20 DIAGNOSIS — E55.9 VITAMIN D DEFICIENCY: ICD-10-CM

## 2019-05-23 ENCOUNTER — HOSPITAL ENCOUNTER (OUTPATIENT)
Dept: LAB | Facility: MEDICAL CENTER | Age: 49
End: 2019-05-23
Attending: NURSE PRACTITIONER
Payer: COMMERCIAL

## 2019-05-23 DIAGNOSIS — E55.9 VITAMIN D DEFICIENCY: ICD-10-CM

## 2019-05-23 LAB — 25(OH)D3 SERPL-MCNC: 59 NG/ML (ref 30–100)

## 2019-05-23 PROCEDURE — 36415 COLL VENOUS BLD VENIPUNCTURE: CPT

## 2019-05-23 PROCEDURE — 82306 VITAMIN D 25 HYDROXY: CPT

## 2019-05-24 ENCOUNTER — APPOINTMENT (RX ONLY)
Dept: URBAN - METROPOLITAN AREA CLINIC 20 | Facility: CLINIC | Age: 49
Setting detail: DERMATOLOGY
End: 2019-05-24

## 2019-05-24 DIAGNOSIS — L81.4 OTHER MELANIN HYPERPIGMENTATION: ICD-10-CM

## 2019-05-24 DIAGNOSIS — D22 MELANOCYTIC NEVI: ICD-10-CM | Status: UNCHANGED

## 2019-05-24 PROBLEM — D22.5 MELANOCYTIC NEVI OF TRUNK: Status: ACTIVE | Noted: 2019-05-24

## 2019-05-24 PROBLEM — D48.5 NEOPLASM OF UNCERTAIN BEHAVIOR OF SKIN: Status: ACTIVE | Noted: 2019-05-24

## 2019-05-24 PROCEDURE — 99213 OFFICE O/P EST LOW 20 MIN: CPT

## 2019-05-24 PROCEDURE — ? PHOTO-DOCUMENTATION

## 2019-05-24 PROCEDURE — ? COUNSELING

## 2019-05-24 PROCEDURE — ? OBSERVATION AND MEASURE

## 2019-05-24 PROCEDURE — ? DEFER

## 2019-05-24 ASSESSMENT — LOCATION ZONE DERM: LOCATION ZONE: TRUNK

## 2019-05-24 ASSESSMENT — LOCATION DETAILED DESCRIPTION DERM: LOCATION DETAILED: LEFT SUPERIOR MEDIAL LOWER BACK

## 2019-05-24 ASSESSMENT — LOCATION SIMPLE DESCRIPTION DERM: LOCATION SIMPLE: LEFT LOWER BACK

## 2019-05-24 NOTE — PROCEDURE: DEFER
Introduction Text (Please End With A Colon): The following procedure was deferred: treatment
Instructions (Optional): Pt wants to hold off on tx for now. Laser vs. LN2 vs. 5FU vs. observation was discussed.  She was given a 5FU informational sheet today to help her make a decision.  \\nPt will schedule when she decides and will check in 3 months
Detail Level: Detailed

## 2019-08-11 RX ORDER — ERGOCALCIFEROL 1.25 MG/1
CAPSULE ORAL
Qty: 4 CAP | Refills: 5 | Status: SHIPPED | OUTPATIENT
Start: 2019-08-11 | End: 2020-02-12 | Stop reason: SDUPTHER

## 2019-08-11 NOTE — TELEPHONE ENCOUNTER
Was the patient seen in the last year in this department? Yes lov 12/20/18    Does patient have an active prescription for medications requested? No     Received Request Via: Pharmacy

## 2019-09-27 ENCOUNTER — APPOINTMENT (RX ONLY)
Dept: URBAN - METROPOLITAN AREA CLINIC 20 | Facility: CLINIC | Age: 49
Setting detail: DERMATOLOGY
End: 2019-09-27

## 2019-09-27 DIAGNOSIS — L81.4 OTHER MELANIN HYPERPIGMENTATION: ICD-10-CM | Status: RESOLVED

## 2019-09-27 DIAGNOSIS — D22 MELANOCYTIC NEVI: ICD-10-CM | Status: STABLE

## 2019-09-27 PROBLEM — D48.5 NEOPLASM OF UNCERTAIN BEHAVIOR OF SKIN: Status: ACTIVE | Noted: 2019-09-27

## 2019-09-27 PROBLEM — D22.5 MELANOCYTIC NEVI OF TRUNK: Status: ACTIVE | Noted: 2019-09-27

## 2019-09-27 PROCEDURE — ? OBSERVATION AND MEASURE

## 2019-09-27 PROCEDURE — ? PHOTO-DOCUMENTATION

## 2019-09-27 PROCEDURE — 99213 OFFICE O/P EST LOW 20 MIN: CPT

## 2019-09-27 PROCEDURE — ? PRESCRIPTION

## 2019-09-27 PROCEDURE — ? COUNSELING

## 2019-09-27 RX ORDER — HYDROQUINONE 4 %
CREAM (GRAM) TOPICAL
Qty: 1 | Refills: 0

## 2019-09-27 ASSESSMENT — LOCATION ZONE DERM
LOCATION ZONE: TRUNK
LOCATION ZONE: FACE

## 2019-09-27 ASSESSMENT — LOCATION DETAILED DESCRIPTION DERM
LOCATION DETAILED: LEFT SUPERIOR MEDIAL LOWER BACK
LOCATION DETAILED: RIGHT INFERIOR CENTRAL MALAR CHEEK
LOCATION DETAILED: LEFT CENTRAL MALAR CHEEK
LOCATION DETAILED: INFERIOR MID FOREHEAD

## 2019-09-27 ASSESSMENT — LOCATION SIMPLE DESCRIPTION DERM
LOCATION SIMPLE: LEFT CHEEK
LOCATION SIMPLE: LEFT LOWER BACK
LOCATION SIMPLE: INFERIOR FOREHEAD
LOCATION SIMPLE: RIGHT CHEEK

## 2019-10-21 ENCOUNTER — OFFICE VISIT (OUTPATIENT)
Dept: MEDICAL GROUP | Facility: LAB | Age: 49
End: 2019-10-21
Payer: COMMERCIAL

## 2019-10-21 ENCOUNTER — HOSPITAL ENCOUNTER (OUTPATIENT)
Dept: LAB | Facility: MEDICAL CENTER | Age: 49
End: 2019-10-21
Attending: NURSE PRACTITIONER
Payer: COMMERCIAL

## 2019-10-21 VITALS
SYSTOLIC BLOOD PRESSURE: 108 MMHG | WEIGHT: 137 LBS | DIASTOLIC BLOOD PRESSURE: 60 MMHG | RESPIRATION RATE: 14 BRPM | TEMPERATURE: 98.5 F | BODY MASS INDEX: 22.02 KG/M2 | OXYGEN SATURATION: 99 % | HEART RATE: 53 BPM | HEIGHT: 66 IN

## 2019-10-21 DIAGNOSIS — R23.2 HOT FLASHES: ICD-10-CM

## 2019-10-21 DIAGNOSIS — E61.1 IRON DEFICIENCY: ICD-10-CM

## 2019-10-21 DIAGNOSIS — R41.3 MEMORY DIFFICULTIES: ICD-10-CM

## 2019-10-21 DIAGNOSIS — M25.551 BILATERAL HIP PAIN: ICD-10-CM

## 2019-10-21 DIAGNOSIS — Z23 NEED FOR INFLUENZA VACCINATION: ICD-10-CM

## 2019-10-21 DIAGNOSIS — Z00.00 PREVENTATIVE HEALTH CARE: ICD-10-CM

## 2019-10-21 DIAGNOSIS — M25.552 BILATERAL HIP PAIN: ICD-10-CM

## 2019-10-21 LAB
ALBUMIN SERPL BCP-MCNC: 4.1 G/DL (ref 3.2–4.9)
ALBUMIN/GLOB SERPL: 1.5 G/DL
ALP SERPL-CCNC: 38 U/L (ref 30–99)
ALT SERPL-CCNC: 16 U/L (ref 2–50)
ANION GAP SERPL CALC-SCNC: 6 MMOL/L (ref 0–11.9)
AST SERPL-CCNC: 17 U/L (ref 12–45)
BASOPHILS # BLD AUTO: 1.2 % (ref 0–1.8)
BASOPHILS # BLD: 0.07 K/UL (ref 0–0.12)
BILIRUB SERPL-MCNC: 0.5 MG/DL (ref 0.1–1.5)
BUN SERPL-MCNC: 19 MG/DL (ref 8–22)
CALCIUM SERPL-MCNC: 9.3 MG/DL (ref 8.5–10.5)
CHLORIDE SERPL-SCNC: 107 MMOL/L (ref 96–112)
CHOLEST SERPL-MCNC: 224 MG/DL (ref 100–199)
CO2 SERPL-SCNC: 26 MMOL/L (ref 20–33)
CREAT SERPL-MCNC: 0.79 MG/DL (ref 0.5–1.4)
EOSINOPHIL # BLD AUTO: 0.1 K/UL (ref 0–0.51)
EOSINOPHIL NFR BLD: 1.8 % (ref 0–6.9)
ERYTHROCYTE [DISTWIDTH] IN BLOOD BY AUTOMATED COUNT: 45.5 FL (ref 35.9–50)
FASTING STATUS PATIENT QL REPORTED: NORMAL
GLOBULIN SER CALC-MCNC: 2.8 G/DL (ref 1.9–3.5)
GLUCOSE SERPL-MCNC: 82 MG/DL (ref 65–99)
HCT VFR BLD AUTO: 45.3 % (ref 37–47)
HDLC SERPL-MCNC: 75 MG/DL
HGB BLD-MCNC: 14.6 G/DL (ref 12–16)
IMM GRANULOCYTES # BLD AUTO: 0.01 K/UL (ref 0–0.11)
IMM GRANULOCYTES NFR BLD AUTO: 0.2 % (ref 0–0.9)
IRON SATN MFR SERPL: 35 % (ref 15–55)
IRON SERPL-MCNC: 147 UG/DL (ref 40–170)
LDLC SERPL CALC-MCNC: 128 MG/DL
LYMPHOCYTES # BLD AUTO: 1.46 K/UL (ref 1–4.8)
LYMPHOCYTES NFR BLD: 25.9 % (ref 22–41)
MCH RBC QN AUTO: 30.2 PG (ref 27–33)
MCHC RBC AUTO-ENTMCNC: 32.2 G/DL (ref 33.6–35)
MCV RBC AUTO: 93.6 FL (ref 81.4–97.8)
MONOCYTES # BLD AUTO: 0.42 K/UL (ref 0–0.85)
MONOCYTES NFR BLD AUTO: 7.4 % (ref 0–13.4)
NEUTROPHILS # BLD AUTO: 3.58 K/UL (ref 2–7.15)
NEUTROPHILS NFR BLD: 63.5 % (ref 44–72)
NRBC # BLD AUTO: 0 K/UL
NRBC BLD-RTO: 0 /100 WBC
PLATELET # BLD AUTO: 197 K/UL (ref 164–446)
PMV BLD AUTO: 11.7 FL (ref 9–12.9)
POTASSIUM SERPL-SCNC: 4.4 MMOL/L (ref 3.6–5.5)
PROGEST SERPL-MCNC: 0.89 NG/ML
PROT SERPL-MCNC: 6.9 G/DL (ref 6–8.2)
RBC # BLD AUTO: 4.84 M/UL (ref 4.2–5.4)
SODIUM SERPL-SCNC: 139 MMOL/L (ref 135–145)
TIBC SERPL-MCNC: 424 UG/DL (ref 250–450)
TRIGL SERPL-MCNC: 107 MG/DL (ref 0–149)
WBC # BLD AUTO: 5.6 K/UL (ref 4.8–10.8)

## 2019-10-21 PROCEDURE — 90686 IIV4 VACC NO PRSV 0.5 ML IM: CPT | Performed by: NURSE PRACTITIONER

## 2019-10-21 PROCEDURE — 90471 IMMUNIZATION ADMIN: CPT | Performed by: NURSE PRACTITIONER

## 2019-10-21 PROCEDURE — 83550 IRON BINDING TEST: CPT

## 2019-10-21 PROCEDURE — 36415 COLL VENOUS BLD VENIPUNCTURE: CPT

## 2019-10-21 PROCEDURE — 84443 ASSAY THYROID STIM HORMONE: CPT

## 2019-10-21 PROCEDURE — 83001 ASSAY OF GONADOTROPIN (FSH): CPT

## 2019-10-21 PROCEDURE — 84439 ASSAY OF FREE THYROXINE: CPT

## 2019-10-21 PROCEDURE — 99214 OFFICE O/P EST MOD 30 MIN: CPT | Mod: 25 | Performed by: NURSE PRACTITIONER

## 2019-10-21 PROCEDURE — 85025 COMPLETE CBC W/AUTO DIFF WBC: CPT

## 2019-10-21 PROCEDURE — 80061 LIPID PANEL: CPT

## 2019-10-21 PROCEDURE — 84144 ASSAY OF PROGESTERONE: CPT

## 2019-10-21 PROCEDURE — 82670 ASSAY OF TOTAL ESTRADIOL: CPT

## 2019-10-21 PROCEDURE — 83540 ASSAY OF IRON: CPT

## 2019-10-21 PROCEDURE — 82533 TOTAL CORTISOL: CPT

## 2019-10-21 PROCEDURE — 82607 VITAMIN B-12: CPT

## 2019-10-21 PROCEDURE — 80053 COMPREHEN METABOLIC PANEL: CPT

## 2019-10-21 ASSESSMENT — ENCOUNTER SYMPTOMS
WHEEZING: 0
HEADACHES: 0
COUGH: 0
SHORTNESS OF BREATH: 1
INSOMNIA: 1
EYES NEGATIVE: 1
DIAPHORESIS: 1
CARDIOVASCULAR NEGATIVE: 1
WEIGHT LOSS: 0
GASTROINTESTINAL NEGATIVE: 1

## 2019-10-21 NOTE — PROGRESS NOTES
"Chief Complaint   Patient presents with   • Menopause     Talk about it       HPI  Kary is a 48-year-old established female here with complaint of fatigue, sleep disturbance, verbal articulation struggles, hot flashes, difficulty with focus x the past year - slowly worsening.  She is wondering if she could have a vitamin deficiency or if she is entering menopause, thus causing all of her symptoms.  Denies any other associated symptoms.  Not taking anything for her symptoms.  Still having menstrual periods every 3-4 months, menses are heavy and last 5-8 days.    Mom had breast cancer.   Nonsmoker  No hx of blood clots.    Also hips are worsening - bilaterally. Feels sharp pains with clicking / popping.  Pain is a deep sensation with sitting.  Pain with activity is intermittent.  Stretching helps.  Last x-rays 2014 - minimal arthritis.  Exercise: variety - dance, bike, run, walk.  Denies any recent injuries or traumas.    Past medical, surgical, family, and social history is reviewed and updated in Epic chart by me today.   Medications and allergies reviewed and updated in Epic chart by me today.     ROS:   Review of Systems   Constitutional: Positive for diaphoresis and malaise/fatigue. Negative for weight loss.   HENT: Negative.    Eyes: Negative.    Respiratory: Positive for shortness of breath. Negative for cough and wheezing.    Cardiovascular: Negative.    Gastrointestinal: Negative.    Genitourinary: Positive for frequency.   Musculoskeletal: Positive for joint pain.   Neurological: Negative for headaches.   Psychiatric/Behavioral: The patient has insomnia.        Exam:  /60 (BP Location: Right arm, Patient Position: Sitting, BP Cuff Size: Adult)   Pulse (!) 53   Temp 36.9 °C (98.5 °F) (Temporal)   Resp 14   Ht 1.676 m (5' 6\")   Wt 62.1 kg (137 lb)   SpO2 99%   Constitutional: Alert, no distress, plus 3 vital signs  Skin:  Warm, dry, no rashes invisible areas  Eye: Equal, round and reactive, " conjunctiva clear  ENMT: Lips without lesions, good dentition, oropharynx clear    Neck: Trachea midline, no masses, no thyromegaly  Respiratory: Unlabored respiration, lungs clear to auscultation, no wheezes, no rhonchi  Cardiovascular: Normal rate and rhythm.  Psych: Alert, pleasant, well-groomed, normal affect    Assessment / Plan / Medical Decision makin. Iron deficiency  -She does have a history of iron deficiency.  Not currently taking iron.  Recommend updated CBC with iron.  - IRON/TOTAL IRON BIND; Future    2. Hot flashes  -Recommend updated labs.  We had a discussion about postmenopausal hormone replacement therapy versus low-dose combination oral contraceptives to control her symptoms if her FSH is elevated and estrogen levels are low.  She would like to try hormones, pending lab results.  We discussed the risk versus benefit of perimenopausal and postmenopausal hormone replacement therapy.  We discussed starting with a low-dose combination oral contraceptive because she is still menstruating and we will discuss this further on email when labs return.  She preferred to stay away from low-dose SSRI therapy.  - FREE THYROXINE; Future  - ESTRADIOL; Future  - PROGESTERONE; Future  - FSH; Future    3. Bilateral hip pain  -She has a PPO and does not need referrals from me.  I discussed that she previously went to the [place] [facility], she was not happy there.  I encouraged her to try [facility] orthoped[facility].    4. Memory difficulties  - FREE THYROXINE; Future  - VITAMIN B12; Future  - CORTISOL; Future    5. Need for influenza vaccination  - Influenza Vaccine Quad Injection (PF)    6. Preventative health care  - TSH; Future  - Comp Metabolic Panel; Future  - CBC WITH DIFFERENTIAL; Future  - Lipid Profile; Future

## 2019-10-22 LAB
CORTIS SERPL-MCNC: 9.3 UG/DL (ref 0–23)
ESTRADIOL SERPL-MCNC: 303 PG/ML
FSH SERPL-ACNC: 9.7 MIU/ML
T4 FREE SERPL-MCNC: 0.77 NG/DL (ref 0.53–1.43)
TSH SERPL DL<=0.005 MIU/L-ACNC: 2 UIU/ML (ref 0.38–5.33)
VIT B12 SERPL-MCNC: 421 PG/ML (ref 211–911)

## 2019-12-02 DIAGNOSIS — F41.1 GAD (GENERALIZED ANXIETY DISORDER): ICD-10-CM

## 2019-12-02 RX ORDER — ALPRAZOLAM 0.25 MG/1
0.25 TABLET ORAL 3 TIMES DAILY PRN
Qty: 30 TAB | Refills: 2 | Status: SHIPPED
Start: 2019-12-02 | End: 2020-11-09 | Stop reason: SDUPTHER

## 2019-12-02 NOTE — TELEPHONE ENCOUNTER
----- Message from Kary Ziegler sent at 12/2/2019  1:54 PM PST -----  Regarding: Prescription Question  Contact: [phone]  I would like my Alprazalom prescription refilled but it is not listed on my medications. Please send a refill to my [facility] pharmacy on [address].  Thank you,  Kary Ziegler

## 2019-12-02 NOTE — TELEPHONE ENCOUNTER
Was the patient seen in the last year in this department? Yes 10/21/2019    Does patient have an active prescription for medications requested? No     Received Request Via: Pharmacy    2/19/2019 #[id number]

## 2019-12-09 RX ORDER — SCOLOPAMINE TRANSDERMAL SYSTEM 1 MG/1
1 PATCH, EXTENDED RELEASE TRANSDERMAL
Qty: 3 PATCH | Refills: 0 | Status: SHIPPED | OUTPATIENT
Start: 2019-12-09 | End: 2020-11-09

## 2020-02-12 RX ORDER — ERGOCALCIFEROL 1.25 MG/1
CAPSULE ORAL
Qty: 4 CAP | Refills: 5 | Status: SHIPPED | OUTPATIENT
Start: 2020-02-12 | End: 2020-08-19

## 2020-03-03 ENCOUNTER — HOSPITAL ENCOUNTER (OUTPATIENT)
Dept: RADIOLOGY | Facility: MEDICAL CENTER | Age: 50
End: 2020-03-03
Attending: NURSE PRACTITIONER
Payer: COMMERCIAL

## 2020-03-03 DIAGNOSIS — Z12.31 VISIT FOR SCREENING MAMMOGRAM: ICD-10-CM

## 2020-03-03 PROCEDURE — 77067 SCR MAMMO BI INCL CAD: CPT | Mod: 50

## 2020-03-04 DIAGNOSIS — R92.8 ABNORMAL MAMMOGRAM OF RIGHT BREAST: ICD-10-CM

## 2020-03-06 ENCOUNTER — HOSPITAL ENCOUNTER (OUTPATIENT)
Dept: RADIOLOGY | Facility: MEDICAL CENTER | Age: 50
End: 2020-03-06
Attending: NURSE PRACTITIONER
Payer: COMMERCIAL

## 2020-03-06 DIAGNOSIS — R92.8 ABNORMAL MAMMOGRAM OF RIGHT BREAST: ICD-10-CM

## 2020-03-06 DIAGNOSIS — R92.8 ABNORMAL MAMMOGRAM: ICD-10-CM

## 2020-03-06 PROCEDURE — G0279 TOMOSYNTHESIS, MAMMO: HCPCS | Mod: RT

## 2020-03-06 PROCEDURE — 76642 ULTRASOUND BREAST LIMITED: CPT | Mod: RT

## 2020-08-19 RX ORDER — ERGOCALCIFEROL 1.25 MG/1
CAPSULE ORAL
Qty: 4 CAP | Refills: 5 | Status: SHIPPED | OUTPATIENT
Start: 2020-08-19 | End: 2021-02-13

## 2020-08-19 NOTE — TELEPHONE ENCOUNTER
Received request via: Pharmacy    Was the patient seen in the last year in this department? Yes  LOV 10/21/2019  Does the patient have an active prescription (recently filled or refills available) for medication(s) requested? No

## 2020-10-12 ENCOUNTER — HOSPITAL ENCOUNTER (OUTPATIENT)
Dept: LAB | Facility: MEDICAL CENTER | Age: 50
End: 2020-10-12
Attending: NURSE PRACTITIONER
Payer: COMMERCIAL

## 2020-10-12 ENCOUNTER — OFFICE VISIT (OUTPATIENT)
Dept: MEDICAL GROUP | Facility: LAB | Age: 50
End: 2020-10-12
Payer: COMMERCIAL

## 2020-10-12 VITALS
DIASTOLIC BLOOD PRESSURE: 78 MMHG | TEMPERATURE: 96.9 F | HEIGHT: 66 IN | BODY MASS INDEX: 21.86 KG/M2 | HEART RATE: 56 BPM | SYSTOLIC BLOOD PRESSURE: 120 MMHG | OXYGEN SATURATION: 99 % | RESPIRATION RATE: 12 BRPM | WEIGHT: 136 LBS

## 2020-10-12 DIAGNOSIS — Z12.11 SCREENING FOR COLORECTAL CANCER: ICD-10-CM

## 2020-10-12 DIAGNOSIS — E55.9 VITAMIN D DEFICIENCY: ICD-10-CM

## 2020-10-12 DIAGNOSIS — E61.1 IRON DEFICIENCY: ICD-10-CM

## 2020-10-12 DIAGNOSIS — Z00.00 PREVENTATIVE HEALTH CARE: ICD-10-CM

## 2020-10-12 DIAGNOSIS — R41.3 MEMORY DIFFICULTIES: ICD-10-CM

## 2020-10-12 DIAGNOSIS — N92.0 MENORRHAGIA WITH REGULAR CYCLE: ICD-10-CM

## 2020-10-12 DIAGNOSIS — M62.81 MUSCLE WEAKNESS: ICD-10-CM

## 2020-10-12 DIAGNOSIS — G47.9 SLEEP DISTURBANCE: ICD-10-CM

## 2020-10-12 DIAGNOSIS — Z12.12 SCREENING FOR COLORECTAL CANCER: ICD-10-CM

## 2020-10-12 DIAGNOSIS — Z23 NEED FOR VACCINATION: ICD-10-CM

## 2020-10-12 DIAGNOSIS — R61 NIGHT SWEATS: ICD-10-CM

## 2020-10-12 LAB
25(OH)D3 SERPL-MCNC: 53 NG/ML (ref 30–100)
ALBUMIN SERPL BCP-MCNC: 4.6 G/DL (ref 3.2–4.9)
ALBUMIN/GLOB SERPL: 1.5 G/DL
ALP SERPL-CCNC: 48 U/L (ref 30–99)
ALT SERPL-CCNC: 13 U/L (ref 2–50)
ANION GAP SERPL CALC-SCNC: 10 MMOL/L (ref 7–16)
AST SERPL-CCNC: 19 U/L (ref 12–45)
BASOPHILS # BLD AUTO: 1.2 % (ref 0–1.8)
BASOPHILS # BLD: 0.05 K/UL (ref 0–0.12)
BILIRUB SERPL-MCNC: 0.4 MG/DL (ref 0.1–1.5)
BUN SERPL-MCNC: 21 MG/DL (ref 8–22)
CALCIUM SERPL-MCNC: 9.7 MG/DL (ref 8.5–10.5)
CHLORIDE SERPL-SCNC: 102 MMOL/L (ref 96–112)
CHOLEST SERPL-MCNC: 258 MG/DL (ref 100–199)
CO2 SERPL-SCNC: 26 MMOL/L (ref 20–33)
CREAT SERPL-MCNC: 0.91 MG/DL (ref 0.5–1.4)
EOSINOPHIL # BLD AUTO: 0.12 K/UL (ref 0–0.51)
EOSINOPHIL NFR BLD: 2.9 % (ref 0–6.9)
ERYTHROCYTE [DISTWIDTH] IN BLOOD BY AUTOMATED COUNT: 43.8 FL (ref 35.9–50)
FASTING STATUS PATIENT QL REPORTED: NORMAL
FSH SERPL-ACNC: 41 MIU/ML
GLOBULIN SER CALC-MCNC: 3 G/DL (ref 1.9–3.5)
GLUCOSE SERPL-MCNC: 80 MG/DL (ref 65–99)
HCT VFR BLD AUTO: 45.7 % (ref 37–47)
HDLC SERPL-MCNC: 84 MG/DL
HGB BLD-MCNC: 14.8 G/DL (ref 12–16)
IMM GRANULOCYTES # BLD AUTO: 0.01 K/UL (ref 0–0.11)
IMM GRANULOCYTES NFR BLD AUTO: 0.2 % (ref 0–0.9)
IRON SATN MFR SERPL: 22 % (ref 15–55)
IRON SERPL-MCNC: 85 UG/DL (ref 40–170)
LDLC SERPL CALC-MCNC: 158 MG/DL
LYMPHOCYTES # BLD AUTO: 1.82 K/UL (ref 1–4.8)
LYMPHOCYTES NFR BLD: 44.4 % (ref 22–41)
MCH RBC QN AUTO: 29.7 PG (ref 27–33)
MCHC RBC AUTO-ENTMCNC: 32.4 G/DL (ref 33.6–35)
MCV RBC AUTO: 91.8 FL (ref 81.4–97.8)
MONOCYTES # BLD AUTO: 0.37 K/UL (ref 0–0.85)
MONOCYTES NFR BLD AUTO: 9 % (ref 0–13.4)
NEUTROPHILS # BLD AUTO: 1.73 K/UL (ref 2–7.15)
NEUTROPHILS NFR BLD: 42.3 % (ref 44–72)
NRBC # BLD AUTO: 0 K/UL
NRBC BLD-RTO: 0 /100 WBC
PLATELET # BLD AUTO: 205 K/UL (ref 164–446)
PMV BLD AUTO: 11.8 FL (ref 9–12.9)
POTASSIUM SERPL-SCNC: 4.7 MMOL/L (ref 3.6–5.5)
PROT SERPL-MCNC: 7.6 G/DL (ref 6–8.2)
RBC # BLD AUTO: 4.98 M/UL (ref 4.2–5.4)
SODIUM SERPL-SCNC: 138 MMOL/L (ref 135–145)
TIBC SERPL-MCNC: 378 UG/DL (ref 250–450)
TRIGL SERPL-MCNC: 78 MG/DL (ref 0–149)
TSH SERPL DL<=0.005 MIU/L-ACNC: 2.87 UIU/ML (ref 0.38–5.33)
UIBC SERPL-MCNC: 293 UG/DL (ref 110–370)
VIT B12 SERPL-MCNC: 679 PG/ML (ref 211–911)
WBC # BLD AUTO: 4.1 K/UL (ref 4.8–10.8)

## 2020-10-12 PROCEDURE — 83550 IRON BINDING TEST: CPT

## 2020-10-12 PROCEDURE — 82306 VITAMIN D 25 HYDROXY: CPT

## 2020-10-12 PROCEDURE — 90471 IMMUNIZATION ADMIN: CPT | Performed by: NURSE PRACTITIONER

## 2020-10-12 PROCEDURE — 82607 VITAMIN B-12: CPT

## 2020-10-12 PROCEDURE — 80061 LIPID PANEL: CPT

## 2020-10-12 PROCEDURE — 36415 COLL VENOUS BLD VENIPUNCTURE: CPT

## 2020-10-12 PROCEDURE — 83001 ASSAY OF GONADOTROPIN (FSH): CPT

## 2020-10-12 PROCEDURE — 90686 IIV4 VACC NO PRSV 0.5 ML IM: CPT | Performed by: NURSE PRACTITIONER

## 2020-10-12 PROCEDURE — 80053 COMPREHEN METABOLIC PANEL: CPT

## 2020-10-12 PROCEDURE — 83540 ASSAY OF IRON: CPT

## 2020-10-12 PROCEDURE — 84443 ASSAY THYROID STIM HORMONE: CPT

## 2020-10-12 PROCEDURE — 99214 OFFICE O/P EST MOD 30 MIN: CPT | Mod: 25 | Performed by: NURSE PRACTITIONER

## 2020-10-12 PROCEDURE — 85025 COMPLETE CBC W/AUTO DIFF WBC: CPT

## 2020-10-12 RX ORDER — FERROUS GLUCONATE 324(38)MG
324 TABLET ORAL
COMMUNITY
End: 2020-11-09

## 2020-10-12 ASSESSMENT — FIBROSIS 4 INDEX: FIB4 SCORE: 1.08

## 2020-10-12 ASSESSMENT — PATIENT HEALTH QUESTIONNAIRE - PHQ9
CLINICAL INTERPRETATION OF PHQ2 SCORE: 2
5. POOR APPETITE OR OVEREATING: 0 - NOT AT ALL
SUM OF ALL RESPONSES TO PHQ QUESTIONS 1-9: 8

## 2020-10-12 NOTE — PATIENT INSTRUCTIONS
Try 5-10 mg melatonin approx 1 hour before bed.    Restart estroven or black cohash at night for night sweats.

## 2020-10-12 NOTE — PROGRESS NOTES
"Chief Complaint   Patient presents with   • Sleep Problem   • Hypertension       HPI  Kary is a 49-year-old established female here to follow-up on several issues:    #1-still having regular, heavy periods that last 8 days.  Bothered with night sweats.  Denies daytime hot flashes.  Not taking anything for menopausal symptoms or to control her menstrual periods.  Does have a chronic history of iron deficiency.  Is not currently taking iron.    #2- new concern over elevated blood pressures: No previous diagnoses of hypertension.  Denies a family history of hypertension.  Has been under a lot of stress.  BP at home can be as high as 140/80 but typically 120/70.  Denies chest pain, headache or leg swelling.    #3-muscle spasms, muscle fatigue and low energy x the past few years - all come and go.  Muscle spasms typically happen in the morning when she wakes up.  States that quads feel significantly more weak in the past year.   Able to do squats / lunges but feels that her muscles fatigue more easily.  Denies knee buckling or legs \"giving out on her.\"  \"just doesn't feel as strong.\"  Has been exercising 5-7 d per week x years.      #4-sleep disturbance:  Chronic issue. Getting about 6 hours of sleep most night.  Wakes up feeling tired.  Takes xanax about every 3rd day which works well for her.  Typically gets 6 hours of sleep.   Has tried trazodone in the past which gave her nightmares.  Waking up sweaty frequently.    #5-mental cognition:  Worried that word recall is getting slower and loosing confidence in what she is saying.  When stress is lower, feels much more clear headed.  Denies forgetting how to do normal activities of daily living, getting lost while driving or forgetting, and names.  Denies feeling confused on a day-to-day basis.  Does not have a family history of early onset Alzheimer's disease.  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  She also works " "with the [employer].    Supplements:    Off iron x a month  Vit D with calcium daily    Past medical, surgical, family, and social history is reviewed and updated in Epic chart by me today.   Medications and allergies reviewed and updated in Epic chart by me today.     ROS:   As documented in history of present illness above    Exam:  /78   Pulse (!) 56   Temp 36.1 °C (96.9 °F)   Resp 12   Ht 1.676 m (5' 6\")   Wt 61.7 kg (136 lb)   SpO2 99%   Gen. appears healthy in no distress   Skin appropriate for sex and age   Neck trachea is midline  Lungs unlabored breathing  Heart regular rate  Neuro gait and station normal   Psych appropriate, calm, interactive, well-groomed    Assessment / Plan / Medical Decision making:   \"  1. Night sweats     2. Vitamin D deficiency  VITAMIN D,25 HYDROXY   3. Menorrhagia with regular cycle  FSH   4. Muscle weakness  VITAMIN B12   5. Iron deficiency  IRON/TOTAL IRON BIND   6. Sleep disturbance     7. Memory difficulties     8. Screening for colorectal cancer  REFERRAL TO GI FOR COLONOSCOPY   9. Need for vaccination  Influenza Vaccine Quad Injection (PF)   10. Preventative health care  Comp Metabolic Panel    CBC WITH DIFFERENTIAL    Lipid Profile    TSH   \"  We discussed a trial of melatonin 5 to 10 mg at night along with Estroven.  She may continue with Xanax as needed at night as she is taking it sparingly and responsibly.  She understands that she should not take Xanax within 6 hours of driving a car, operating machinery or drinking alcohol.  She understands the chemically dependent nature of Xanax.  Also encouraged her to have a full panel of labs drawn and then follow-up here for a full physical exam in 3 to 4 weeks.  She was referred for screening colonoscopy.  Influenza vaccine updated today.  Encouraged her to continue with her diligent exercise program.  Most likely she is iron deficient and we discussed this but she would like to await labs prior to " restarting iron.  I did not see any obvious concerns regarding her memory today but I did offer her referral to neurology/memory specialty which she declines.

## 2020-11-02 ENCOUNTER — APPOINTMENT (RX ONLY)
Dept: URBAN - METROPOLITAN AREA CLINIC 22 | Facility: CLINIC | Age: 50
Setting detail: DERMATOLOGY
End: 2020-11-02

## 2020-11-02 DIAGNOSIS — Z71.89 OTHER SPECIFIED COUNSELING: ICD-10-CM

## 2020-11-02 DIAGNOSIS — L81.4 OTHER MELANIN HYPERPIGMENTATION: ICD-10-CM

## 2020-11-02 DIAGNOSIS — D22 MELANOCYTIC NEVI: ICD-10-CM

## 2020-11-02 DIAGNOSIS — D18.0 HEMANGIOMA: ICD-10-CM

## 2020-11-02 PROBLEM — D18.01 HEMANGIOMA OF SKIN AND SUBCUTANEOUS TISSUE: Status: ACTIVE | Noted: 2020-11-02

## 2020-11-02 PROBLEM — D22.62 MELANOCYTIC NEVI OF LEFT UPPER LIMB, INCLUDING SHOULDER: Status: ACTIVE | Noted: 2020-11-02

## 2020-11-02 PROBLEM — D22.5 MELANOCYTIC NEVI OF TRUNK: Status: ACTIVE | Noted: 2020-11-02

## 2020-11-02 PROBLEM — D23.61 OTHER BENIGN NEOPLASM OF SKIN OF RIGHT UPPER LIMB, INCLUDING SHOULDER: Status: ACTIVE | Noted: 2020-11-02

## 2020-11-02 PROCEDURE — ? OBSERVATION AND MEASURE

## 2020-11-02 PROCEDURE — ? PHOTO-DOCUMENTATION

## 2020-11-02 PROCEDURE — ? COUNSELING

## 2020-11-02 PROCEDURE — 99214 OFFICE O/P EST MOD 30 MIN: CPT

## 2020-11-02 ASSESSMENT — LOCATION DETAILED DESCRIPTION DERM
LOCATION DETAILED: LEFT LATERAL SUPERIOR CHEST
LOCATION DETAILED: SUPERIOR THORACIC SPINE
LOCATION DETAILED: LEFT SUPERIOR MEDIAL MIDBACK
LOCATION DETAILED: LOWER STERNUM
LOCATION DETAILED: INFERIOR THORACIC SPINE
LOCATION DETAILED: INFERIOR MID FOREHEAD
LOCATION DETAILED: LEFT VENTRAL PROXIMAL FOREARM
LOCATION DETAILED: LEFT PROXIMAL POSTERIOR UPPER ARM
LOCATION DETAILED: RIGHT VENTRAL PROXIMAL FOREARM

## 2020-11-02 ASSESSMENT — LOCATION ZONE DERM
LOCATION ZONE: ARM
LOCATION ZONE: TRUNK
LOCATION ZONE: FACE

## 2020-11-02 ASSESSMENT — LOCATION SIMPLE DESCRIPTION DERM
LOCATION SIMPLE: INFERIOR FOREHEAD
LOCATION SIMPLE: RIGHT FOREARM
LOCATION SIMPLE: LEFT FOREARM
LOCATION SIMPLE: CHEST
LOCATION SIMPLE: UPPER BACK
LOCATION SIMPLE: LEFT POSTERIOR UPPER ARM
LOCATION SIMPLE: LEFT LOWER BACK

## 2020-11-02 NOTE — PROCEDURE: MIPS QUALITY
Quality 226: Preventive Care And Screening: Tobacco Use: Screening And Cessation Intervention: Patient screened for tobacco use and is an ex/non-smoker
Detail Level: Detailed
Quality 130: Documentation Of Current Medications In The Medical Record: Eligible clinician attests to documenting in the medical record the patient is not eligible for a current list of medications being obtained, updated, or reviewed by the eligible clinician

## 2020-11-09 ENCOUNTER — OFFICE VISIT (OUTPATIENT)
Dept: MEDICAL GROUP | Facility: LAB | Age: 50
End: 2020-11-09
Payer: COMMERCIAL

## 2020-11-09 VITALS
WEIGHT: 136 LBS | RESPIRATION RATE: 12 BRPM | TEMPERATURE: 97.6 F | OXYGEN SATURATION: 99 % | BODY MASS INDEX: 21.86 KG/M2 | DIASTOLIC BLOOD PRESSURE: 70 MMHG | HEIGHT: 66 IN | HEART RATE: 56 BPM | SYSTOLIC BLOOD PRESSURE: 100 MMHG

## 2020-11-09 DIAGNOSIS — E78.5 HYPERLIPIDEMIA, UNSPECIFIED HYPERLIPIDEMIA TYPE: ICD-10-CM

## 2020-11-09 DIAGNOSIS — Z00.00 WELL ADULT EXAM: ICD-10-CM

## 2020-11-09 DIAGNOSIS — F41.1 GAD (GENERALIZED ANXIETY DISORDER): ICD-10-CM

## 2020-11-09 PROCEDURE — 99396 PREV VISIT EST AGE 40-64: CPT | Performed by: NURSE PRACTITIONER

## 2020-11-09 RX ORDER — ALPRAZOLAM 0.25 MG/1
0.25 TABLET ORAL 3 TIMES DAILY PRN
Qty: 30 TAB | Refills: 2 | Status: SHIPPED | OUTPATIENT
Start: 2020-11-09 | End: 2021-06-01

## 2020-11-09 ASSESSMENT — FIBROSIS 4 INDEX: FIB4 SCORE: 1.29

## 2020-11-09 NOTE — PROGRESS NOTES
CC  Annual exam    HPI:  Kary is a 49 y.o.  female who presents for annual exam. Taking an OTC estrogen supplement - night sweats are improving.  Stopped melatonin - made her groggy in the morning.     Taking gummy fiber supplements.   BP at home /60-80.    Regarding her health maintenance:   Last pap: 2018  Abnormal Pap hx: not that she can recall.   Periods: irregular - last 1.5 mo ago  Last Mammo:3/2020  Last colonoscopy: scheduled for 12/2020.    Bone density test:N\A   Last Lab: due for follow up   Last Td:current   Influenza vaccination:current   Pneumococcal vaccination:not applicable   Hx STD''s: no   Regular exercise: yes      meds:     Current Outpatient Medications:   •  ALPRAZolam, 0.25 mg, Oral, TID PRN  •  vitamin D (Ergocalciferol), TAKE 1 CAPSULE BY MOUTH EVERY 7 DAYS  •  albuterol, 1-2 Puff, Inhalation, Q4HRS PRN    Allergies: No Known Allergies    family:   Family History   Problem Relation Age of Onset   • Cancer Mother         breast - dx 6/2012   • Other Mother         alopecia, vitiligo   • Depression Mother    • Anxiety disorder Mother    • Genitourinary () Problems Mother         uterine fibroids - had hysterectomy   • Anxiety disorder Sister    • Stroke Sister 51   • Cancer Maternal Grandmother         breast   • Heart Disease Maternal Grandfather    • Cancer Paternal Grandmother         lung - smoker       social hx:   Social History     Socioeconomic History   • Marital status:      Spouse name: Not on file   • Number of children: Not on file   • Years of education: Not on file   • Highest education level: Not on file   Occupational History   • Not on file   Social Needs   • Financial resource strain: Not on file   • Food insecurity     Worry: Not on file     Inability: Not on file   • Transportation needs     Medical: Not on file     Non-medical: Not on file   Tobacco Use   • Smoking status: Never Smoker   • Smokeless tobacco: Never Used   Substance and Sexual Activity   •  "Alcohol use: Yes     Comment: socially   • Drug use: No   • Sexual activity: Not on file     Comment: ; one child; [employer]   Lifestyle   • Physical activity     Days per week: Not on file     Minutes per session: Not on file   • Stress: Not on file   Relationships   • Social connections     Talks on phone: Not on file     Gets together: Not on file     Attends Mu-ism service: Not on file     Active member of club or organization: Not on file     Attends meetings of clubs or organizations: Not on file     Relationship status: Not on file   • Intimate partner violence     Fear of current or ex partner: Not on file     Emotionally abused: Not on file     Physically abused: Not on file     Forced sexual activity: Not on file   Other Topics Concern   • Not on file   Social History Narrative   • Not on file       ROS:  No fever, chills, sweats.   No polydipsia, polyuria, temperature intolerance, significant weight changes   No visual changes, blurred vision.  No chest pain, palpitations, peripheral swelling   No chronic cough, shortness of breath, dyspnea with exertion.   No dysphagia, odynophagia, black or bloody stools.   No abdominal pain, nausea, persistent diarrhea, constipation   No dysuria, hematuria, incontinence. Denies nocturia  No rash, pruritis, pigment changes.   No focal weakness, syncope, headache, confusion, persistent numbness.   All other systems are reviewed and negative.    PHYSICAL EXAMINATION:  /70 (BP Location: Left arm, Patient Position: Sitting, BP Cuff Size: Adult)   Pulse (!) 56   Temp 36.4 °C (97.6 °F)   Resp 12   Ht 1.676 m (5' 6\")   Wt 61.7 kg (136 lb)   SpO2 99%   General appearance:healthy, well developed, well nourished  Psych: alert, no distress, cooperative  Eyes: EOM's normal, pupils equal, round, reactive to light  ENT: Ears: external ears normal to inspection and palpation, TM's clear, Nose/Sinuses: nose shows no deformity, asymmetry, or " inflammation  Neck: no asymmetry, masses, or scars, no adenopathy, thyroid normal to palpation  Lungs:chest symmetric with normal A/P diameter, no chest deformities noted, normal respiratory rate and rhythm  Cardiovascular:regular rate and rhythm, S1 normal  Abdomen: umbilicus normal, no masses palpable, no organomegaly  Musculoskeletal:ROM of all joints is normal, no evidence of joint instability  Lymphatic: None significantly enlarged  Skin: no rash, no edema  Neuro: mental status intact, cranial nerves 2-12 intact      ASSESSMENT/PLAN:  1.annual physical exam: HCM:  Encourage monthly self breast exam  Encourage daily exercise for at least 30 minutes  Recommend mammogram yearly.  Scheduled for colonoscopy in dec.   Recommend 1500 mg Calcium with 600 units vit d daily.    Labs are UTD & reviewed with her in depth.  Encouraged f/u with her therapist regarding her sleep / nightmares - prefers to refrain from rx meds for this.   Pap is UTD.

## 2021-02-08 ENCOUNTER — OFFICE VISIT (OUTPATIENT)
Dept: MEDICAL GROUP | Facility: LAB | Age: 51
End: 2021-02-08
Payer: COMMERCIAL

## 2021-02-08 VITALS
HEART RATE: 62 BPM | OXYGEN SATURATION: 99 % | DIASTOLIC BLOOD PRESSURE: 62 MMHG | HEIGHT: 66 IN | BODY MASS INDEX: 22.02 KG/M2 | RESPIRATION RATE: 12 BRPM | SYSTOLIC BLOOD PRESSURE: 100 MMHG | WEIGHT: 137 LBS | TEMPERATURE: 98.2 F

## 2021-02-08 DIAGNOSIS — R61 NIGHT SWEATS: ICD-10-CM

## 2021-02-08 DIAGNOSIS — Z78.0 MENOPAUSE: ICD-10-CM

## 2021-02-08 DIAGNOSIS — J30.2 SEASONAL ALLERGIES: ICD-10-CM

## 2021-02-08 PROCEDURE — 99214 OFFICE O/P EST MOD 30 MIN: CPT | Performed by: NURSE PRACTITIONER

## 2021-02-08 RX ORDER — TRAZODONE HYDROCHLORIDE 50 MG/1
50 TABLET ORAL
Qty: 30 TAB | Refills: 2 | Status: SHIPPED | OUTPATIENT
Start: 2021-02-08 | End: 2021-04-11 | Stop reason: SDUPTHER

## 2021-02-08 ASSESSMENT — PATIENT HEALTH QUESTIONNAIRE - PHQ9: CLINICAL INTERPRETATION OF PHQ2 SCORE: 0

## 2021-02-08 ASSESSMENT — FIBROSIS 4 INDEX: FIB4 SCORE: 1.31

## 2021-02-08 NOTE — PROGRESS NOTES
"Chief Complaint   Patient presents with   • Sinus Problem   • Sleep Disruption       HPI   Kary is a 49 yo est female here to discuss poor sleep and night sweats:  #1- night sweats and poor sleep:  Waking up frequently in sweats and frequently.  Tried black cohash but this made her dizzy - no significant difference.  Sleeping with ice pack under pillow which helps.  Turning heat down and sleeping with light clothes.  Not drenched in sweat.  Present x months.  Mom had breast CA - HER2+.  Pt had genetic testing through 23 and me - negative for BRCA gene.  Has not seen a genetic MD.  Wants to retry trazodone for better sleep - had nightmares with it in the past.  Takes prn xanax which helps.    #2-periodic dizziness and sinus pain: States that she has a couple days on and off in which she gets dizzy easily and has sinus pain, pointing to bilateral maxillary region.  Denies purulent mucus production, ear fullness, cough.  Has postnasal drainage.  Mucus is clear.  Denies fevers or any other associated symptoms.  Symptoms have been on and off for several months and she states this has happened to her periodically throughout the years.  She does have Claritin and Flonase but does not use them.    Past medical, surgical, family, and social history is reviewed and updated in Epic chart by me today.   Medications and allergies reviewed and updated in Epic chart by me today.     ROS:   As documented in history of present illness above    Exam:  /62 (BP Location: Left arm, Patient Position: Sitting, BP Cuff Size: Adult)   Pulse 62   Temp 36.8 °C (98.2 °F)   Resp 12   Ht 1.676 m (5' 6\")   Wt 62.1 kg (137 lb)   SpO2 99%   Constitutional: Alert, no distress, plus 3 vital signs  Skin:  Warm, dry, no rashes invisible areas  Eye: Equal, round and reactive, conjunctiva clear  ENMT: Lips without lesions, good dentition, oropharynx without erythema or exudate.  No sinus tenderness today.  Tympanic membrane translucent " bilaterally.  Ear canals are without cerumen.  Neck: Trachea midline, no masses, no thyromegaly.  No significantly enlarged cervical lymphadenopathy.  Respiratory: Unlabored respiration, lungs clear to auscultation, no wheezes, no rhonchi  Cardiovascular: Normal rate and rhythm.  Psych: Alert, pleasant, well-groomed, normal affect    Assessment / Plan / Medical Decision makin. Night sweats  -Because of the patient's mother's history of HER-2 positive breast cancer, I would like for her to see a genetic MD prior to any consideration of postmenopausal hormone replacement therapy although she was not ready for this referral today.  We discussed natural treatment and she would like to do a 1 month trial of over-the-counter Estroven, a high soy diet and magnesium prior to bedtime.  She would also like to retry low-dose trazodone for sleep to assess if she is more tolerant to this without migraines.  She is not interested in a trial of Paxil, Effexor or Lexapro at this time although we discussed off label usage of SSRI and SNRI therapy for night sweats/hot flashes.  She will get back in touch with me in about a month.  2.  Possible sinusitis:  Asymptomatic today.  States that she started feeling better few days ago.  Discussed etiology of sinus issues including environmental allergies, viruses, bacteria.  Current symptoms likely related to environmental allergies as her mucus is clear and she is afebrile.  Benign exam today.  Trial of restarting generic Claritin and Flonase for the next few weeks and following up with me on an as-needed basis if symptoms do not resolve.    3. Seasonal allergies  -As above

## 2021-02-13 RX ORDER — ERGOCALCIFEROL 1.25 MG/1
CAPSULE ORAL
Qty: 4 CAPSULE | Refills: 5 | Status: SHIPPED | OUTPATIENT
Start: 2021-02-13 | End: 2021-06-09

## 2021-02-13 NOTE — TELEPHONE ENCOUNTER
Received request via: Pharmacy    Was the patient seen in the last year in this department? Yes    Does the patient have an active prescription (recently filled or refills available) for medication(s) requested? No     VITAMIN D2 1.25MG(50,000 UNIT)

## 2021-04-12 RX ORDER — TRAZODONE HYDROCHLORIDE 50 MG/1
50 TABLET ORAL
Qty: 90 TABLET | Refills: 0 | Status: SHIPPED | OUTPATIENT
Start: 2021-04-12 | End: 2021-07-14

## 2021-04-12 NOTE — TELEPHONE ENCOUNTER
Received request via: Pharmacy    Was the patient seen in the last year in this department? Yes  2/8/21  Does the patient have an active prescription (recently filled or refills available) for medication(s) requested? No

## 2021-05-05 ENCOUNTER — HOSPITAL ENCOUNTER (OUTPATIENT)
Dept: LAB | Facility: MEDICAL CENTER | Age: 51
End: 2021-05-05
Attending: NURSE PRACTITIONER
Payer: COMMERCIAL

## 2021-05-05 DIAGNOSIS — E78.5 HYPERLIPIDEMIA, UNSPECIFIED HYPERLIPIDEMIA TYPE: ICD-10-CM

## 2021-05-05 LAB
CHOLEST SERPL-MCNC: 245 MG/DL (ref 100–199)
FASTING STATUS PATIENT QL REPORTED: NORMAL
HDLC SERPL-MCNC: 97 MG/DL
LDLC SERPL CALC-MCNC: 138 MG/DL
TRIGL SERPL-MCNC: 49 MG/DL (ref 0–149)

## 2021-05-05 PROCEDURE — 80061 LIPID PANEL: CPT

## 2021-05-05 PROCEDURE — 36415 COLL VENOUS BLD VENIPUNCTURE: CPT

## 2021-05-07 ENCOUNTER — HOSPITAL ENCOUNTER (OUTPATIENT)
Dept: RADIOLOGY | Facility: MEDICAL CENTER | Age: 51
End: 2021-05-07
Attending: NURSE PRACTITIONER
Payer: COMMERCIAL

## 2021-05-07 DIAGNOSIS — Z12.31 VISIT FOR SCREENING MAMMOGRAM: ICD-10-CM

## 2021-05-07 PROCEDURE — 77063 BREAST TOMOSYNTHESIS BI: CPT

## 2021-05-12 DIAGNOSIS — R92.30 DENSE BREAST TISSUE: ICD-10-CM

## 2021-06-01 DIAGNOSIS — F41.1 GAD (GENERALIZED ANXIETY DISORDER): ICD-10-CM

## 2021-06-01 RX ORDER — ALPRAZOLAM 0.25 MG/1
0.25 TABLET ORAL 2 TIMES DAILY PRN
Qty: 30 TABLET | Refills: 0 | Status: SHIPPED | OUTPATIENT
Start: 2021-06-01 | End: 2021-10-04

## 2021-06-09 ENCOUNTER — HOSPITAL ENCOUNTER (OUTPATIENT)
Facility: MEDICAL CENTER | Age: 51
End: 2021-06-09
Attending: NURSE PRACTITIONER
Payer: COMMERCIAL

## 2021-06-09 ENCOUNTER — OFFICE VISIT (OUTPATIENT)
Dept: MEDICAL GROUP | Facility: LAB | Age: 51
End: 2021-06-09
Payer: COMMERCIAL

## 2021-06-09 VITALS
SYSTOLIC BLOOD PRESSURE: 100 MMHG | DIASTOLIC BLOOD PRESSURE: 74 MMHG | BODY MASS INDEX: 21.69 KG/M2 | RESPIRATION RATE: 12 BRPM | TEMPERATURE: 97.3 F | WEIGHT: 135 LBS | OXYGEN SATURATION: 98 % | HEIGHT: 66 IN | HEART RATE: 56 BPM

## 2021-06-09 DIAGNOSIS — Z12.4 SCREENING FOR MALIGNANT NEOPLASM OF CERVIX: ICD-10-CM

## 2021-06-09 DIAGNOSIS — Z01.419 ENCOUNTER FOR GYNECOLOGICAL EXAMINATION: ICD-10-CM

## 2021-06-09 PROCEDURE — 88175 CYTOPATH C/V AUTO FLUID REDO: CPT

## 2021-06-09 PROCEDURE — 99396 PREV VISIT EST AGE 40-64: CPT | Performed by: NURSE PRACTITIONER

## 2021-06-09 ASSESSMENT — FIBROSIS 4 INDEX: FIB4 SCORE: 1.31

## 2021-06-09 NOTE — PROGRESS NOTES
CC  Annual exam / pap    HPI:  Kary is a 50 y.o.  female who presents for annual exam. Generally the patient is feeling good.   Regarding her health maintenance:   Last pap: 3 yrs ago  Abnormal Pap hx: none  Periods: 4/2021 and 12/2020.  fsh 40 10/2020.   Last Mammo:UTD.  Denies breast pain. + family hx of breast CA in mom.   Last colonoscopy: UTD.  Denies bowel issues.   Bone density test:N\A   Last Lab: due for follow up   Last Td:current   Influenza vaccination:current   Pneumococcal vaccination:not applicable   covid vaccines UTD  Hx STD''s: no   Regular exercise: yes    meds:   Current Outpatient Medications   Medication Sig Dispense Refill   • ALPRAZolam (XANAX) 0.25 MG Tab Take 1 tablet by mouth 2 times a day as needed for Anxiety for up to 30 days. 30 tablet 0   • traZODone (DESYREL) 50 MG Tab Take 1 tablet by mouth at bedtime. 90 tablet 0   • vitamin D, Ergocalciferol, (DRISDOL) 1.25 MG (22145 UT) Cap capsule TAKE 1 CAPSULE BY MOUTH EVERY 7 DAYS 4 capsule 5   • albuterol (PROAIR HFA) 108 (90 BASE) MCG/ACT AERS Inhale 1-2 Puffs by mouth every four hours as needed for Shortness of Breath. 1 Inhaler 2     No current facility-administered medications for this visit.       Allergies: No Known Allergies    family:   Family History   Problem Relation Age of Onset   • Cancer Mother         breast - dx 6/2012   • Other Mother         alopecia, vitiligo   • Depression Mother    • Anxiety disorder Mother    • Genitourinary () Problems Mother         uterine fibroids - had hysterectomy   • Anxiety disorder Sister    • Stroke Sister 51   • Cancer Maternal Grandmother         breast   • Heart Disease Maternal Grandfather    • Cancer Paternal Grandmother         lung - smoker       social hx:   Social History     Socioeconomic History   • Marital status:      Spouse name: Not on file   • Number of children: Not on file   • Years of education: Not on file   • Highest education level: Not on file   Occupational  History   • Not on file   Tobacco Use   • Smoking status: Never Smoker   • Smokeless tobacco: Never Used   Substance and Sexual Activity   • Alcohol use: Yes     Comment: socially   • Drug use: No   • Sexual activity: Not on file     Comment: ; one child; Alz association   Other Topics Concern   • Not on file   Social History Narrative   • Not on file     Social Determinants of Health     Financial Resource Strain:    • Difficulty of Paying Living Expenses:    Food Insecurity:    • Worried About Running Out of Food in the Last Year:    • Ran Out of Food in the Last Year:    Transportation Needs:    • Lack of Transportation (Medical):    • Lack of Transportation (Non-Medical):    Physical Activity:    • Days of Exercise per Week:    • Minutes of Exercise per Session:    Stress:    • Feeling of Stress :    Social Connections:    • Frequency of Communication with Friends and Family:    • Frequency of Social Gatherings with Friends and Family:    • Attends Hoahaoism Services:    • Active Member of Clubs or Organizations:    • Attends Club or Organization Meetings:    • Marital Status:    Intimate Partner Violence:    • Fear of Current or Ex-Partner:    • Emotionally Abused:    • Physically Abused:    • Sexually Abused:        ROS:  No fever, chills, sweats.   No polydipsia, polyuria, temperature intolerance, significant weight changes   No visual changes, blurred vision.  No chest pain, palpitations, peripheral swelling   No chronic cough, shortness of breath, dyspnea with exertion.   No dysphagia, odynophagia, black or bloody stools.   No abdominal pain, nausea, persistent diarrhea, constipation   No dysuria, hematuria, incontinence. Denies nocturia  No rash, pruritis, pigment changes.   No focal weakness, syncope, headache, confusion, persistent numbness.   All other systems are reviewed and negative.    PHYSICAL EXAMINATION:  /74 (BP Location: Left arm, Patient Position: Sitting, BP Cuff Size: Adult)    "Pulse (!) 56   Temp 36.3 °C (97.3 °F)   Resp 12   Ht 1.676 m (5' 6\")   Wt 61.2 kg (135 lb)   SpO2 98%   General appearance:healthy, well developed, well nourished  Psych: alert, no distress, cooperative  Eyes: EOM's normal, pupils equal, round, reactive to light  ENT: Ears: external ears normal to inspection and palpation, TM's clear, Nose/Sinuses: nose shows no deformity, asymmetry, or inflammation  Neck: no asymmetry, masses, or scars, no adenopathy, thyroid normal to palpation  Lungs:chest symmetric with normal A/P diameter, no chest deformities noted, normal respiratory rate and rhythm  Cardiovascular:regular rate and rhythm, S1 normal  Breasts: normal in size and symmetry, skin normal, physiologic fibronodularity  Abdomen: umbilicus normal, no masses palpable, no organomegaly  Musculoskeletal:ROM of all joints is normal, no evidence of joint instability  Lymphatic: None significantly enlarged  Skin: no rash, no edema  Neuro: mental status intact, cranial nerves 2-12 intact  Pelvic: external genitalia normal, cervix normal in appearance, bimanual exam reveals normal uterus, adnexa without masses or tenderness, vaginal mucosa normal      ASSESSMENT/PLAN:  1.annual physical exam: HCM:  Pap and breast exams done.  BSE technique reviewed and patient encouraged to perform self-exam monthly.   Encourage monthly self breast exam  Encourage daily exercise for at least 30 minutes  Recommend mammogram yearly.  Consider dexa next year due to entry into menopause and high risk factors.  Recommend 1500 mg Calcium with 600 units vit d daily.    Pap q 3 yrs.  Recommend labs in the fall.   "

## 2021-06-10 LAB — CYTOLOGY REG CYTOL: NORMAL

## 2021-07-14 RX ORDER — TRAZODONE HYDROCHLORIDE 50 MG/1
TABLET ORAL
Qty: 90 TABLET | Refills: 0 | Status: SHIPPED | OUTPATIENT
Start: 2021-07-14 | End: 2022-03-29

## 2021-07-14 NOTE — TELEPHONE ENCOUNTER
Received request via: Pharmacy    Was the patient seen in the last year in this department? Yes  LOV 06/09/2021  Does the patient have an active prescription (recently filled or refills available) for medication(s) requested? No

## 2021-10-01 DIAGNOSIS — F41.1 GAD (GENERALIZED ANXIETY DISORDER): ICD-10-CM

## 2021-10-04 RX ORDER — ALPRAZOLAM 0.25 MG/1
0.25 TABLET ORAL 2 TIMES DAILY PRN
Qty: 30 TABLET | Refills: 0 | Status: SHIPPED | OUTPATIENT
Start: 2021-10-04 | End: 2022-01-26

## 2021-11-10 ENCOUNTER — PATIENT MESSAGE (OUTPATIENT)
Dept: MEDICAL GROUP | Facility: LAB | Age: 51
End: 2021-11-10

## 2021-11-10 DIAGNOSIS — Z00.00 WELL ADULT EXAM: ICD-10-CM

## 2021-11-10 DIAGNOSIS — Z00.00 PREVENTATIVE HEALTH CARE: ICD-10-CM

## 2021-11-13 ENCOUNTER — HOSPITAL ENCOUNTER (OUTPATIENT)
Dept: LAB | Facility: MEDICAL CENTER | Age: 51
End: 2021-11-13
Attending: NURSE PRACTITIONER
Payer: COMMERCIAL

## 2021-11-13 DIAGNOSIS — Z00.00 WELL ADULT EXAM: ICD-10-CM

## 2021-11-13 DIAGNOSIS — Z00.00 PREVENTATIVE HEALTH CARE: ICD-10-CM

## 2021-11-13 LAB
ALBUMIN SERPL BCP-MCNC: 4.8 G/DL (ref 3.2–4.9)
ALBUMIN/GLOB SERPL: 1.5 G/DL
ALP SERPL-CCNC: 54 U/L (ref 30–99)
ALT SERPL-CCNC: 11 U/L (ref 2–50)
ANION GAP SERPL CALC-SCNC: 15 MMOL/L (ref 7–16)
AST SERPL-CCNC: 21 U/L (ref 12–45)
BASOPHILS # BLD AUTO: 0.9 % (ref 0–1.8)
BASOPHILS # BLD: 0.04 K/UL (ref 0–0.12)
BILIRUB SERPL-MCNC: 0.3 MG/DL (ref 0.1–1.5)
BUN SERPL-MCNC: 15 MG/DL (ref 8–22)
CALCIUM SERPL-MCNC: 10.4 MG/DL (ref 8.5–10.5)
CHLORIDE SERPL-SCNC: 107 MMOL/L (ref 96–112)
CHOLEST SERPL-MCNC: 269 MG/DL (ref 100–199)
CO2 SERPL-SCNC: 22 MMOL/L (ref 20–33)
CREAT SERPL-MCNC: 0.88 MG/DL (ref 0.5–1.4)
EOSINOPHIL # BLD AUTO: 0.1 K/UL (ref 0–0.51)
EOSINOPHIL NFR BLD: 2.2 % (ref 0–6.9)
ERYTHROCYTE [DISTWIDTH] IN BLOOD BY AUTOMATED COUNT: 42.8 FL (ref 35.9–50)
EST. AVERAGE GLUCOSE BLD GHB EST-MCNC: 103 MG/DL
FASTING STATUS PATIENT QL REPORTED: NORMAL
GLOBULIN SER CALC-MCNC: 3.1 G/DL (ref 1.9–3.5)
GLUCOSE SERPL-MCNC: 90 MG/DL (ref 65–99)
HBA1C MFR BLD: 5.2 % (ref 4–5.6)
HCT VFR BLD AUTO: 45.6 % (ref 37–47)
HDLC SERPL-MCNC: 86 MG/DL
HGB BLD-MCNC: 14.9 G/DL (ref 12–16)
IMM GRANULOCYTES # BLD AUTO: 0.01 K/UL (ref 0–0.11)
IMM GRANULOCYTES NFR BLD AUTO: 0.2 % (ref 0–0.9)
IRON SATN MFR SERPL: 22 % (ref 15–55)
IRON SERPL-MCNC: 80 UG/DL (ref 40–170)
LDLC SERPL CALC-MCNC: 170 MG/DL
LYMPHOCYTES # BLD AUTO: 1.68 K/UL (ref 1–4.8)
LYMPHOCYTES NFR BLD: 37 % (ref 22–41)
MCH RBC QN AUTO: 29.9 PG (ref 27–33)
MCHC RBC AUTO-ENTMCNC: 32.7 G/DL (ref 33.6–35)
MCV RBC AUTO: 91.4 FL (ref 81.4–97.8)
MONOCYTES # BLD AUTO: 0.35 K/UL (ref 0–0.85)
MONOCYTES NFR BLD AUTO: 7.7 % (ref 0–13.4)
NEUTROPHILS # BLD AUTO: 2.36 K/UL (ref 2–7.15)
NEUTROPHILS NFR BLD: 52 % (ref 44–72)
NRBC # BLD AUTO: 0 K/UL
NRBC BLD-RTO: 0 /100 WBC
PLATELET # BLD AUTO: 190 K/UL (ref 164–446)
PMV BLD AUTO: 11.8 FL (ref 9–12.9)
POTASSIUM SERPL-SCNC: 5.3 MMOL/L (ref 3.6–5.5)
PROT SERPL-MCNC: 7.9 G/DL (ref 6–8.2)
RBC # BLD AUTO: 4.99 M/UL (ref 4.2–5.4)
SODIUM SERPL-SCNC: 144 MMOL/L (ref 135–145)
TIBC SERPL-MCNC: 365 UG/DL (ref 250–450)
TRIGL SERPL-MCNC: 65 MG/DL (ref 0–149)
TSH SERPL DL<=0.005 MIU/L-ACNC: 2.38 UIU/ML (ref 0.38–5.33)
UIBC SERPL-MCNC: 285 UG/DL (ref 110–370)
WBC # BLD AUTO: 4.5 K/UL (ref 4.8–10.8)

## 2021-11-13 PROCEDURE — 36415 COLL VENOUS BLD VENIPUNCTURE: CPT

## 2021-11-13 PROCEDURE — 83540 ASSAY OF IRON: CPT

## 2021-11-13 PROCEDURE — 82306 VITAMIN D 25 HYDROXY: CPT

## 2021-11-13 PROCEDURE — 85025 COMPLETE CBC W/AUTO DIFF WBC: CPT

## 2021-11-13 PROCEDURE — 80061 LIPID PANEL: CPT

## 2021-11-13 PROCEDURE — 84443 ASSAY THYROID STIM HORMONE: CPT

## 2021-11-13 PROCEDURE — 83036 HEMOGLOBIN GLYCOSYLATED A1C: CPT

## 2021-11-13 PROCEDURE — 83550 IRON BINDING TEST: CPT

## 2021-11-13 PROCEDURE — 80053 COMPREHEN METABOLIC PANEL: CPT

## 2021-11-13 PROCEDURE — 84591 ASSAY OF NOS VITAMIN: CPT

## 2021-11-15 ENCOUNTER — PATIENT MESSAGE (OUTPATIENT)
Dept: MEDICAL GROUP | Facility: LAB | Age: 51
End: 2021-11-15

## 2021-11-15 ENCOUNTER — NON-PROVIDER VISIT (OUTPATIENT)
Dept: MEDICAL GROUP | Facility: LAB | Age: 51
End: 2021-11-15
Payer: COMMERCIAL

## 2021-11-15 DIAGNOSIS — E78.5 HYPERLIPIDEMIA, UNSPECIFIED HYPERLIPIDEMIA TYPE: ICD-10-CM

## 2021-11-15 DIAGNOSIS — Z23 NEED FOR VACCINATION: ICD-10-CM

## 2021-11-15 DIAGNOSIS — Z91.89 OTHER SPECIFIED PERSONAL RISK FACTORS, NOT ELSEWHERE CLASSIFIED: ICD-10-CM

## 2021-11-15 PROCEDURE — 90686 IIV4 VACC NO PRSV 0.5 ML IM: CPT | Performed by: NURSE PRACTITIONER

## 2021-11-15 PROCEDURE — 90471 IMMUNIZATION ADMIN: CPT | Performed by: NURSE PRACTITIONER

## 2021-11-15 NOTE — TELEPHONE ENCOUNTER
From: Kary Ziegler  To: Nurse Practitioner Lou Ritter  Sent: 11/15/2021 9:23 AM PST  Subject: Toney Cholesterol    Ugh! That cholesterol is very high. I have been taking fiber supplements regularly, getting moderate exercise and not eating too much red meat. I do eat eggs and cheese a lot.  I would like to do the cardiac scoring and then go from there :(

## 2021-11-15 NOTE — PROGRESS NOTES
"Kary Ziegler is a 51 y.o. female here for a non-provider visit for:   FLU    Reason for immunization: Annual Flu Vaccine  Immunization records indicate need for vaccine: Yes, confirmed with Epic  Minimum interval has been met for this vaccine: Yes  ABN completed: Not Indicated    VIS Dated  8/6/2021 was given to patient: Yes  All IAC Questionnaire questions were answered \"No.\"    Patient tolerated injection and no adverse effects were observed or reported: Yes    Pt scheduled for next dose in series: Not Indicated  "

## 2021-11-16 LAB — 25(OH)D3 SERPL-MCNC: 34 NG/ML (ref 30–80)

## 2021-11-18 ENCOUNTER — PATIENT MESSAGE (OUTPATIENT)
Dept: MEDICAL GROUP | Facility: LAB | Age: 51
End: 2021-11-18

## 2021-11-18 RX ORDER — ERGOCALCIFEROL 1.25 MG/1
50000 CAPSULE ORAL
Qty: 12 CAPSULE | Refills: 3 | Status: SHIPPED | OUTPATIENT
Start: 2021-11-18 | End: 2023-04-07 | Stop reason: SDUPTHER

## 2021-11-18 NOTE — TELEPHONE ENCOUNTER
Okay to submit Rx refill request for vitamin D 50,000 international units once weekly, #12 with 3 refills.  Thank you!  Thank you for letting Kary know.

## 2021-11-19 ENCOUNTER — HOSPITAL ENCOUNTER (OUTPATIENT)
Dept: RADIOLOGY | Facility: MEDICAL CENTER | Age: 51
End: 2021-11-19
Attending: NURSE PRACTITIONER
Payer: COMMERCIAL

## 2021-11-19 DIAGNOSIS — Z91.89 OTHER SPECIFIED PERSONAL RISK FACTORS, NOT ELSEWHERE CLASSIFIED: ICD-10-CM

## 2021-11-19 DIAGNOSIS — E78.5 HYPERLIPIDEMIA, UNSPECIFIED HYPERLIPIDEMIA TYPE: ICD-10-CM

## 2021-11-19 PROCEDURE — 4410556 CT-CARDIAC SCORING (SELF PAY ONLY)

## 2021-11-22 PROBLEM — E78.49 OTHER HYPERLIPIDEMIA: Status: ACTIVE | Noted: 2021-11-22

## 2021-12-03 ENCOUNTER — HOSPITAL ENCOUNTER (OUTPATIENT)
Dept: LAB | Facility: MEDICAL CENTER | Age: 51
End: 2021-12-03
Attending: NURSE PRACTITIONER
Payer: COMMERCIAL

## 2021-12-03 PROCEDURE — 84591 ASSAY OF NOS VITAMIN: CPT

## 2021-12-13 LAB — NIACIN SERPL-MCNC: 2.37 UG/ML (ref 0.5–8.45)

## 2022-01-26 DIAGNOSIS — F41.1 GAD (GENERALIZED ANXIETY DISORDER): ICD-10-CM

## 2022-01-26 RX ORDER — ALPRAZOLAM 0.25 MG/1
TABLET ORAL
Qty: 30 TABLET | Refills: 0 | Status: SHIPPED | OUTPATIENT
Start: 2022-01-26 | End: 2022-07-14

## 2022-01-26 NOTE — TELEPHONE ENCOUNTER
Received request via: Pharmacy    Was the patient seen in the last year in this department? Yes  LOV : 6/9/2021  Does the patient have an active prescription (recently filled or refills available) for medication(s) requested? No

## 2022-03-07 ENCOUNTER — TELEPHONE (OUTPATIENT)
Dept: MEDICAL GROUP | Facility: LAB | Age: 52
End: 2022-03-07
Payer: COMMERCIAL

## 2022-03-07 ENCOUNTER — APPOINTMENT (RX ONLY)
Dept: URBAN - METROPOLITAN AREA CLINIC 4 | Facility: CLINIC | Age: 52
Setting detail: DERMATOLOGY
End: 2022-03-07

## 2022-03-07 DIAGNOSIS — R68.84 JAW PAIN: ICD-10-CM

## 2022-03-07 DIAGNOSIS — D18.0 HEMANGIOMA: ICD-10-CM

## 2022-03-07 DIAGNOSIS — L81.4 OTHER MELANIN HYPERPIGMENTATION: ICD-10-CM

## 2022-03-07 DIAGNOSIS — D22 MELANOCYTIC NEVI: ICD-10-CM

## 2022-03-07 DIAGNOSIS — F42.4 EXCORIATION (SKIN-PICKING) DISORDER: ICD-10-CM

## 2022-03-07 PROBLEM — D22.72 MELANOCYTIC NEVI OF LEFT LOWER LIMB, INCLUDING HIP: Status: ACTIVE | Noted: 2022-03-07

## 2022-03-07 PROBLEM — D23.61 OTHER BENIGN NEOPLASM OF SKIN OF RIGHT UPPER LIMB, INCLUDING SHOULDER: Status: ACTIVE | Noted: 2022-03-07

## 2022-03-07 PROBLEM — D22.5 MELANOCYTIC NEVI OF TRUNK: Status: ACTIVE | Noted: 2022-03-07

## 2022-03-07 PROBLEM — D22.39 MELANOCYTIC NEVI OF OTHER PARTS OF FACE: Status: ACTIVE | Noted: 2022-03-07

## 2022-03-07 PROBLEM — D22.4 MELANOCYTIC NEVI OF SCALP AND NECK: Status: ACTIVE | Noted: 2022-03-07

## 2022-03-07 PROBLEM — L30.9 DERMATITIS, UNSPECIFIED: Status: ACTIVE | Noted: 2022-03-07

## 2022-03-07 PROBLEM — D18.01 HEMANGIOMA OF SKIN AND SUBCUTANEOUS TISSUE: Status: ACTIVE | Noted: 2022-03-07

## 2022-03-07 PROCEDURE — 99213 OFFICE O/P EST LOW 20 MIN: CPT

## 2022-03-07 PROCEDURE — ? OBSERVATION

## 2022-03-07 PROCEDURE — ? COUNSELING

## 2022-03-07 ASSESSMENT — LOCATION SIMPLE DESCRIPTION DERM
LOCATION SIMPLE: SCALP
LOCATION SIMPLE: CHEST
LOCATION SIMPLE: LEFT LOWER BACK
LOCATION SIMPLE: LEFT CHEEK
LOCATION SIMPLE: ABDOMEN
LOCATION SIMPLE: RIGHT SHOULDER
LOCATION SIMPLE: LEFT THIGH
LOCATION SIMPLE: LEFT BREAST
LOCATION SIMPLE: LEFT FOREARM
LOCATION SIMPLE: RIGHT UPPER BACK
LOCATION SIMPLE: LEFT UPPER BACK
LOCATION SIMPLE: LEFT SHOULDER

## 2022-03-07 ASSESSMENT — LOCATION DETAILED DESCRIPTION DERM
LOCATION DETAILED: LEFT SUPERIOR PARIETAL SCALP
LOCATION DETAILED: RIGHT RIB CAGE
LOCATION DETAILED: LEFT VENTRAL PROXIMAL FOREARM
LOCATION DETAILED: RIGHT POSTERIOR SHOULDER
LOCATION DETAILED: LEFT MEDIAL BREAST 10-11:00 REGION
LOCATION DETAILED: LEFT LATERAL SUPERIOR CHEST
LOCATION DETAILED: RIGHT CENTRAL PARIETAL SCALP
LOCATION DETAILED: LEFT ANTERIOR PROXIMAL THIGH
LOCATION DETAILED: RIGHT ANTERIOR SHOULDER
LOCATION DETAILED: LEFT LATERAL ABDOMEN
LOCATION DETAILED: LEFT ANTERIOR LATERAL DISTAL THIGH
LOCATION DETAILED: LEFT MEDIAL MALAR CHEEK
LOCATION DETAILED: LEFT LATERAL UPPER BACK
LOCATION DETAILED: LEFT POSTERIOR SHOULDER
LOCATION DETAILED: LEFT INFERIOR LATERAL MIDBACK
LOCATION DETAILED: RIGHT INFERIOR UPPER BACK

## 2022-03-07 ASSESSMENT — LOCATION ZONE DERM
LOCATION ZONE: FACE
LOCATION ZONE: LEG
LOCATION ZONE: SCALP
LOCATION ZONE: ARM
LOCATION ZONE: TRUNK

## 2022-03-07 NOTE — TELEPHONE ENCOUNTER
----- Message from TANK Petersen sent at 3/5/2022 11:15 AM PST -----  Regarding: FW: Toney needs ENT(?)  Ok to put in for ENT and I will sign - thanks  ----- Message -----  From: Bernadette Lyles, Med Ass't  Sent: 3/4/2022   4:28 PM PST  To: TANK Petersen  Subject: FW: Toney needs ENT(?)                            ----- Message -----  From: Kary Ziegler  Sent: 3/4/2022   2:15 PM PST  To: Marie Hart Ma  Subject: Ziegler needs ENT(?)                              Braxton Blake,  I have had pain in my jaw, sinuses and scalp. Symptoms started in Oct. with a change and significant jaw pain in Dec. which sent me to the dentist. No sign of cavity or infection. Dentist gave me a temp. splint which relieved the deep and chronic jaw pain but caused pain in the sinus cavities so I stopped using it. It is somehow connected to my ear bc I feel like I am hearing less, like it's plugged from flying. I went in a helicopter and the pain significantly increased in my jaw/ear which scared me. However, Flying in a plane did not make it worse. I have been trying to get an appt with an Orofacial doctor but they are new and don't accept my insurance yet.  I am back to taking Advil daily, which helps, but I think I need to see an ENT. Should I schedule with you first or can you recommend/refer one to me?   Thank you and I hope you are well.  Kary

## 2022-03-21 ENCOUNTER — PATIENT MESSAGE (OUTPATIENT)
Dept: MEDICAL GROUP | Facility: LAB | Age: 52
End: 2022-03-21
Payer: COMMERCIAL

## 2022-03-21 DIAGNOSIS — M54.2 NECK PAIN: ICD-10-CM

## 2022-03-29 ENCOUNTER — OFFICE VISIT (OUTPATIENT)
Dept: MEDICAL GROUP | Facility: LAB | Age: 52
End: 2022-03-29
Payer: COMMERCIAL

## 2022-03-29 VITALS
OXYGEN SATURATION: 99 % | WEIGHT: 137 LBS | SYSTOLIC BLOOD PRESSURE: 136 MMHG | HEIGHT: 66 IN | HEART RATE: 58 BPM | RESPIRATION RATE: 12 BRPM | TEMPERATURE: 97.5 F | DIASTOLIC BLOOD PRESSURE: 82 MMHG | BODY MASS INDEX: 22.02 KG/M2

## 2022-03-29 DIAGNOSIS — R51.9 LEFT FACIAL PAIN: ICD-10-CM

## 2022-03-29 DIAGNOSIS — M54.2 POSTERIOR NECK PAIN: ICD-10-CM

## 2022-03-29 DIAGNOSIS — M50.30 DDD (DEGENERATIVE DISC DISEASE), CERVICAL: ICD-10-CM

## 2022-03-29 DIAGNOSIS — M62.830 LUMBAR PARASPINAL MUSCLE SPASM: ICD-10-CM

## 2022-03-29 PROCEDURE — 99213 OFFICE O/P EST LOW 20 MIN: CPT | Performed by: NURSE PRACTITIONER

## 2022-03-29 ASSESSMENT — PATIENT HEALTH QUESTIONNAIRE - PHQ9: CLINICAL INTERPRETATION OF PHQ2 SCORE: 0

## 2022-03-29 ASSESSMENT — FIBROSIS 4 INDEX: FIB4 SCORE: 1.73

## 2022-03-29 NOTE — PROGRESS NOTES
"CC  f/u      HPI:  Kary is a 51-year-old established female here with a couple of issues:  1.-Neck pain/stiffness: Went to ER in [place], [place] for neck pain on 3/19 (hx of same about 7 yrs ago).   Negative CT soft tissue neck and cspine.  Negative cbc, cmp.  Given methocarbamol which makes her feel \"normal,\" / calms her but hasn't helped her neck.  Took 600 mg advil q 6 hours for several days, nothing today.  Overall she feels dramatically improved and her neck is not as stiff but still causes her discomfort.  She did have an MRI of her cervical spine thousand 16 which showed mild to moderate degenerative disc disease.  She does not have numbness or tingling radiating down her arms.  She has not had a specific injury.    2-left sided ear and facial pain for a few months that comes and goes - \"sharp.\"  Left jaw pain - soreness that doesn't hurt to eat / chew.  Had pain in nose 10/2021 with sharp pains up to head - stopped around no but this is when jaw pain started.  Saw dentist and was told all is well.  Has ENT appt 4/7/2022.  Doesn't feel that she is hearing as well.  +pain to left maxillary sinus area.  No other associated symptoms.      Exam:  /82 (BP Location: Right arm, Patient Position: Sitting, BP Cuff Size: Adult)   Pulse (!) 58   Temp 36.4 °C (97.5 °F)   Resp 12   Ht 1.676 m (5' 6\")   Wt 62.1 kg (137 lb)   SpO2 99%   Gen. appears healthy in no distress   Skin appropriate for sex and age   Neck trachea is midline   ENT: Bilateral tympanic membranes are translucent, oropharynx is without exudate or erythema.  No posterior oropharyngeal edema.  She does have left-sided sinus tenderness.  Lungs unlabored breathing, lungs are clear to auscultation bilaterally  Heart regular rate  Neuro gait and station normal   Psych appropriate, calm, interactive, well-groomed      A/P:  1. DDD (degenerative disc disease), cervical  MR-CERVICAL SPINE-W/O    Referral to Physical Therapy   2. Posterior neck pain "  MR-CERVICAL SPINE-W/O    Referral to Physical Therapy   3. Lumbar paraspinal muscle spasm  DX-LUMBAR SPINE-2 OR 3 VIEWS   4. Left facial pain       Recommend updated MRI of cervical spine along with physical therapy.  May continue to use methocarbamol as needed.  Discussed consult with physiatry versus neurosurgery based on progression of MRI cervical spine compared to 2016.  Also encouraged her to contact me when she travels in the future so that I can send her with muscle relaxers and a steroid pack.    She also complains of periodic low back spasms that come and go, recommend x-ray lumbar spine and discussing this with physical therapy.  She was not acutely having back spasms today.    In terms of left-sided facial pain that is been present for several months, benign exam today with the exception of left-sided sinus tenderness.  She is vaccinated against shingles and I did not see a rash.  Encouraged her to keep her ENT appointment.  I did offer to place her on antibiotics which she declined, stating she prefers to wait for ENT.    We will follow up with her when imaging returns.

## 2022-06-16 ENCOUNTER — PATIENT MESSAGE (OUTPATIENT)
Dept: MEDICAL GROUP | Facility: LAB | Age: 52
End: 2022-06-16
Payer: COMMERCIAL

## 2022-06-16 DIAGNOSIS — M54.2 NECK PAIN: ICD-10-CM

## 2022-06-16 RX ORDER — METHYLPREDNISOLONE 4 MG/1
TABLET ORAL
Qty: 21 TABLET | Refills: 0 | Status: SHIPPED | OUTPATIENT
Start: 2022-06-16 | End: 2022-07-20

## 2022-07-09 ENCOUNTER — TELEMEDICINE (OUTPATIENT)
Dept: TELEHEALTH | Facility: TELEMEDICINE | Age: 52
End: 2022-07-09
Payer: COMMERCIAL

## 2022-07-09 VITALS
HEIGHT: 66 IN | BODY MASS INDEX: 21.86 KG/M2 | HEART RATE: 88 BPM | WEIGHT: 136 LBS | TEMPERATURE: 97.8 F | OXYGEN SATURATION: 98 %

## 2022-07-09 DIAGNOSIS — U07.1 COVID: ICD-10-CM

## 2022-07-09 PROCEDURE — 99213 OFFICE O/P EST LOW 20 MIN: CPT | Mod: 95 | Performed by: NURSE PRACTITIONER

## 2022-07-09 ASSESSMENT — ENCOUNTER SYMPTOMS
SORE THROAT: 0
DIZZINESS: 0
FEVER: 0
CARDIOVASCULAR NEGATIVE: 1
EYE REDNESS: 0
SHORTNESS OF BREATH: 0
NAUSEA: 0
WHEEZING: 0
NECK PAIN: 0
COUGH: 1
HEADACHES: 0
VOMITING: 0
EYE DISCHARGE: 0
CONSTIPATION: 0
WEAKNESS: 0
ABDOMINAL PAIN: 0
CHILLS: 0
DIARRHEA: 0
MYALGIAS: 0

## 2022-07-09 ASSESSMENT — FIBROSIS 4 INDEX: FIB4 SCORE: 1.73

## 2022-07-09 NOTE — PROGRESS NOTES
"Subjective     Kary Ziegler is a 51 y.o. female who presents with Medical Advice (Isolation for Positive covid test)          This evaluation was conducted via Zoom using secure and encrypted videoconferencing technology. The patient was in their home in the state [facility].    The patient's identity was confirmed and verbal consent was obtained for this virtual visit.    HPI  States took at home COVID test: positive 2 days ago. Symptoms started with dry scratchy throat. States symptoms stable, stable vitals, no fever, malaise or body aches, but nasal congestion, mild coughing. Taking plain Mucinex. Tylenol as needed but not taken \"in a while\". States symptoms started 5 days ago. Denies any chronic health issues but does take Xanax on occasion.  tested positive today. Mother is presently asymptomatic.     PMH:  has no past medical history on file.  MEDS:   Current Outpatient Medications:   •  methylPREDNISolone (MEDROL DOSEPAK) 4 MG Tablet Therapy Pack, Take as directed, Disp: 21 Tablet, Rfl: 0  •  vitamin D2, Ergocalciferol, (DRISDOL) 1.25 MG (25769 UT) Cap capsule, Take 1 Capsule by mouth every 7 days., Disp: 12 Capsule, Rfl: 3  •  BREO ELLIPTA 200-25 MCG/INH AEROSOL POWDER, BREATH ACTIVATED, , Disp: , Rfl:   ALLERGIES: No Known Allergies  SURGHX: History reviewed. No pertinent surgical history.  SOCHX:  reports that she has never smoked. She has never used smokeless tobacco. She reports current alcohol use. She reports that she does not use drugs.  FH: Family history was reviewed, no pertinent findings to report    Review of Systems   Constitutional: Negative for chills, fever and malaise/fatigue.   HENT: Positive for congestion. Negative for ear pain and sore throat.    Eyes: Negative for discharge and redness.   Respiratory: Positive for cough. Negative for shortness of breath and wheezing.    Cardiovascular: Negative.    Gastrointestinal: Negative for abdominal pain, constipation, diarrhea, " "nausea and vomiting.   Musculoskeletal: Negative for myalgias and neck pain.   Skin: Negative for itching and rash.   Neurological: Negative for dizziness, weakness and headaches.   Endo/Heme/Allergies: Negative for environmental allergies.   All other systems reviewed and are negative.             Objective     Pulse 88   Temp 36.6 °C (97.8 °F) (Temporal)   Ht 1.676 m (5' 6\")   Wt 61.7 kg (136 lb)   SpO2 98%   BMI 21.95 kg/m²      Physical Exam  Constitutional:       General: She is awake. She is not in acute distress.     Appearance: Normal appearance. She is not ill-appearing, toxic-appearing or diaphoretic.   Pulmonary:      Effort: Pulmonary effort is normal.   Neurological:      Mental Status: She is alert and oriented to person, place, and time.   Psychiatric:         Attention and Perception: Attention normal.         Mood and Affect: Mood normal.         Speech: Speech normal.         Behavior: Behavior normal. Behavior is cooperative.         Thought Content: Thought content normal.         Cognition and Memory: Cognition and memory normal.         Judgment: Judgment normal.                             Assessment & Plan        1. COVID    - Nirmatrelvir & Ritonavir 20 x 150 MG & 10 x 100MG Tablet Therapy Pack; Take 300 mg nirmatrelvir (two 150 mg tablets) with 100 mg ritonavir (one 100 mg tablet) by mouth, with all three tablets taken together twice daily for 5 days.  Dispense: 30 Each; Refill: 0    -Patient has improved symptoms and afebrile, patient states she would like treatment sent at this time  -Stay home isolated from others until COVID test resulted the follow CDC guidelines for positive cases as discussed  -Increase water intake  -May use over the counter Tylenol/Ibuprofen as needed for fever or body aches  -Get rest  -Salt water gargle as needed for any sore throat  -May use over the counter Flonase, saline nasal spray as needed for any nasal congestion  -May use over the counter cough " suppressant medications like plain Robitussin/Delsym as needed   -Monitor for fevers, cough, shortness of breath, chest pain, chest tightness, lethargy- need re-evaluation

## 2022-07-14 DIAGNOSIS — F41.1 GAD (GENERALIZED ANXIETY DISORDER): ICD-10-CM

## 2022-07-14 RX ORDER — ALPRAZOLAM 0.25 MG/1
TABLET ORAL
Qty: 30 TABLET | Refills: 0 | Status: SHIPPED | OUTPATIENT
Start: 2022-07-14 | End: 2022-12-12

## 2022-07-20 ENCOUNTER — OFFICE VISIT (OUTPATIENT)
Dept: MEDICAL GROUP | Facility: LAB | Age: 52
End: 2022-07-20
Payer: COMMERCIAL

## 2022-07-20 VITALS
OXYGEN SATURATION: 100 % | RESPIRATION RATE: 16 BRPM | SYSTOLIC BLOOD PRESSURE: 122 MMHG | HEIGHT: 66 IN | HEART RATE: 52 BPM | DIASTOLIC BLOOD PRESSURE: 76 MMHG | TEMPERATURE: 96.7 F | WEIGHT: 138 LBS | BODY MASS INDEX: 22.18 KG/M2

## 2022-07-20 DIAGNOSIS — Z00.00 PREVENTATIVE HEALTH CARE: ICD-10-CM

## 2022-07-20 DIAGNOSIS — J01.00 ACUTE NON-RECURRENT MAXILLARY SINUSITIS: ICD-10-CM

## 2022-07-20 DIAGNOSIS — N94.10 DYSPAREUNIA IN FEMALE: ICD-10-CM

## 2022-07-20 PROCEDURE — 99214 OFFICE O/P EST MOD 30 MIN: CPT | Performed by: NURSE PRACTITIONER

## 2022-07-20 RX ORDER — GABAPENTIN 100 MG/1
CAPSULE ORAL
COMMUNITY
Start: 2022-07-12 | End: 2022-08-15

## 2022-07-20 RX ORDER — DOXYCYCLINE HYCLATE 100 MG
100 TABLET ORAL 2 TIMES DAILY
Qty: 14 TABLET | Refills: 0 | Status: SHIPPED | OUTPATIENT
Start: 2022-07-20 | End: 2022-07-27

## 2022-07-20 RX ORDER — ESTRADIOL 0.1 MG/G
CREAM VAGINAL
Qty: 42.5 G | Refills: 11 | Status: SHIPPED | OUTPATIENT
Start: 2022-07-20 | End: 2023-04-11

## 2022-07-20 ASSESSMENT — FIBROSIS 4 INDEX: FIB4 SCORE: 1.73

## 2022-07-20 NOTE — PROGRESS NOTES
"Chief Complaint   Patient presents with   • Sinus Pain     Since June 7th       HPI:  Kary is a 51-year-old established female here with complaint of new onset left-sided facial pain, pointing to her maxillary sinus region.  This is complicated by the patient currently going through pretty intense work-up with an orofacial physician regarding possible TMJ versus other issues and is awaiting an MRI of her brain in a week and a half.  Acutely experiencing constant aching pain to left maxillary sinus region x almost 6 weeks.  Wakes her up at night and present during the day.  Heat / advil helps - resolves for hours and then returns.    Went to dentist yesterday thinking this was a tooth issue - told not a tooth and was likely sinuses.  X-rays done.   Has a lot of phlegm in throat but had covid 2 weeks ago.   Mucus is now clear.    Seeing orofacial physician and CT scan was done before this started.  Saw ENT a few mo ago and was told that she has TMJ.  Has MRI brain scheduled friday and was rx gabapentin but hasn't started it.      Also has had painful intercourse for at least the past year.  Has not been sexually active with her  for over 6 months.  Is interested in any type of treatment for this.  She is obviously menopausal and not on HRT -has a family history of HER2 positive breast cancer.      Exam:  /76   Pulse (!) 52   Temp 35.9 °C (96.7 °F) (Temporal)   Resp 16   Ht 1.676 m (5' 6\")   Wt 62.6 kg (138 lb)   SpO2 100%   Gen. appears healthy in no distress   Skin appropriate for sex and age   Neck trachea is midline.  No significantly enlarged cervical lymphadenopathy.  ENT: Oropharynx slightly erythematous without edema.  She does have left-sided maxillary sinus tenderness, does not have right-sided maxillary sinus or frontal sinus tenderness.  Nasal mucosa is a bit boggy.  Tympanic membranes with air-fluid interface but not erythematous or bulging.  Lungs unlabored breathing  Heart regular " rate  Neuro gait and station normal   Psych appropriate, calm, interactive, well-groomed    A/P:  1. Acute non-recurrent maxillary sinusitis  -Discussed that diagnosing a sinus infection is a difficult thing without imaging.  She wanted to try antibiotics, stating that she has had symptoms beyond the past 5 weeks, on and off for months and is curious if antibiotics would help.  She plans on moving forward with further work-up with her orofacial physician in regards to brain MRI.  She will let me know how she is feeling in about a week.  - doxycycline (VIBRAMYCIN) 100 MG Tab; Take 1 Tablet by mouth 2 times a day for 7 days.  Dispense: 14 Tablet; Refill: 0    2. Dyspareunia in female  -Trial of vaginal estrogen.  Discussed minimal systemic absorption with vaginal estrogen and low risk.  She will let me know if this is helping her over the next couple of weeks.  - estradiol (ESTRACE) 0.1 MG/GM vaginal cream; Insert 1 gm nightly for 7 nights and then only 3 nights per week.  Dispense: 42.5 g; Refill: 11    3. Preventative health care  -Recommend a full updated lab panel in November.  - CBC WITH DIFFERENTIAL; Future  - Lipid Profile; Future  - Comp Metabolic Panel; Future  - TSH; Future  - VITAMIN D,25 HYDROXY; Future

## 2022-08-15 ENCOUNTER — OFFICE VISIT (OUTPATIENT)
Dept: MEDICAL GROUP | Facility: LAB | Age: 52
End: 2022-08-15
Payer: COMMERCIAL

## 2022-08-15 VITALS
HEART RATE: 58 BPM | HEIGHT: 66 IN | OXYGEN SATURATION: 97 % | SYSTOLIC BLOOD PRESSURE: 120 MMHG | RESPIRATION RATE: 12 BRPM | TEMPERATURE: 97 F | WEIGHT: 140 LBS | DIASTOLIC BLOOD PRESSURE: 70 MMHG | BODY MASS INDEX: 22.5 KG/M2

## 2022-08-15 DIAGNOSIS — R61 NIGHT SWEATS: ICD-10-CM

## 2022-08-15 DIAGNOSIS — F41.9 ANXIETY: ICD-10-CM

## 2022-08-15 DIAGNOSIS — G47.9 SLEEP DISTURBANCE: ICD-10-CM

## 2022-08-15 DIAGNOSIS — H93.8X3 SENSATION OF FULLNESS IN BOTH EARS: ICD-10-CM

## 2022-08-15 DIAGNOSIS — J34.89 SINUS PAIN: ICD-10-CM

## 2022-08-15 PROCEDURE — 99214 OFFICE O/P EST MOD 30 MIN: CPT | Performed by: NURSE PRACTITIONER

## 2022-08-15 RX ORDER — CITALOPRAM HYDROBROMIDE 10 MG/1
10 TABLET ORAL DAILY
Qty: 30 TABLET | Refills: 5 | Status: SHIPPED | OUTPATIENT
Start: 2022-08-15 | End: 2022-11-22

## 2022-08-15 ASSESSMENT — FIBROSIS 4 INDEX: FIB4 SCORE: 1.73

## 2022-08-15 NOTE — PROGRESS NOTES
"CC  F/u         HPI:  Kary is a 51-year-old established female here with 2 issues:  1.-Would like a referral to see ENT.  Recently started allergy shots.  Negative MRI of sinus areas.  Neg dentist w/u.  Orofacial md offered injections.  Hesitant to start gabapentin for chronic jaw/sinus pain.  Wants to return to ENT b/c of zinger with chewing / chronic ear clogging and sinus areas still feel full with aching.      #2-constant night sweats - waking her up 3-4 x at night x at least a year.  Interested in celexa - aunt does well with this.  Taking half a Xanax 2-3 nights a week which helps her fall and stay asleep but hesitant to take Xanax nightly.  Suffers from chronic daytime anxiety and easy tearfulness.  Has mild depression.  No SI or HI.        Exam:  /70 (BP Location: Right arm, Patient Position: Sitting, BP Cuff Size: Adult)   Pulse (!) 58   Temp 36.1 °C (97 °F)   Resp 12   Ht 1.676 m (5' 6\")   Wt 63.5 kg (140 lb)   SpO2 97%    Gen. appears healthy in no distress   Skin appropriate for sex and age   Neck trachea is midline  Lungs unlabored breathing  Heart regular rate  Neuro gait and station normal   Psych appropriate, calm, interactive, well-groomed    A/P:  \"  1. Sensation of fullness in both ears  Referral to ENT      2. Sinus pain  Referral to ENT      3. Sleep disturbance        4. Anxiety        5. Night sweats        \"  Because of chronic/persistent sinus fullness along with similar ear sensation, referred back to ENT per patient preference.    In terms of night sweats, anxiety, sleep disturbance and mild depression, recommended trial of citalopram 10 mg.  Discussed length of onset efficacy and possible side effects.  Encouraged her to follow-up with me in 1 month which she prefers to do via MyChart.  May continue to use alprazolam as needed.    Discussed preventative versus abortive medications for anxiety, depression and menopausal symptoms.  "

## 2022-08-17 ENCOUNTER — HOSPITAL ENCOUNTER (OUTPATIENT)
Dept: RADIOLOGY | Facility: MEDICAL CENTER | Age: 52
End: 2022-08-17
Attending: NURSE PRACTITIONER
Payer: COMMERCIAL

## 2022-08-17 DIAGNOSIS — Z12.31 VISIT FOR SCREENING MAMMOGRAM: ICD-10-CM

## 2022-08-17 PROCEDURE — 77063 BREAST TOMOSYNTHESIS BI: CPT

## 2022-08-21 DIAGNOSIS — R92.8 ABNORMAL MAMMOGRAM OF RIGHT BREAST: ICD-10-CM

## 2022-08-23 ENCOUNTER — HOSPITAL ENCOUNTER (OUTPATIENT)
Dept: RADIOLOGY | Facility: MEDICAL CENTER | Age: 52
End: 2022-08-23
Attending: NURSE PRACTITIONER
Payer: COMMERCIAL

## 2022-08-23 DIAGNOSIS — R92.8 ABNORMAL MAMMOGRAM OF RIGHT BREAST: ICD-10-CM

## 2022-08-23 DIAGNOSIS — R92.8 ABNORMAL MAMMOGRAM: ICD-10-CM

## 2022-08-23 PROCEDURE — G0279 TOMOSYNTHESIS, MAMMO: HCPCS

## 2022-08-23 PROCEDURE — 76642 ULTRASOUND BREAST LIMITED: CPT | Mod: RT

## 2022-11-02 ENCOUNTER — NON-PROVIDER VISIT (OUTPATIENT)
Dept: MEDICAL GROUP | Facility: LAB | Age: 52
End: 2022-11-02
Payer: COMMERCIAL

## 2022-11-02 ENCOUNTER — HOSPITAL ENCOUNTER (OUTPATIENT)
Dept: LAB | Facility: MEDICAL CENTER | Age: 52
End: 2022-11-02
Attending: NURSE PRACTITIONER
Payer: COMMERCIAL

## 2022-11-02 DIAGNOSIS — Z23 NEED FOR VACCINATION: ICD-10-CM

## 2022-11-02 DIAGNOSIS — Z00.00 PREVENTATIVE HEALTH CARE: ICD-10-CM

## 2022-11-02 LAB
25(OH)D3 SERPL-MCNC: 47 NG/ML (ref 30–100)
ALBUMIN SERPL BCP-MCNC: 4.3 G/DL (ref 3.2–4.9)
ALBUMIN/GLOB SERPL: 1.4 G/DL
ALP SERPL-CCNC: 52 U/L (ref 30–99)
ALT SERPL-CCNC: 11 U/L (ref 2–50)
ANION GAP SERPL CALC-SCNC: 13 MMOL/L (ref 7–16)
AST SERPL-CCNC: 23 U/L (ref 12–45)
BASOPHILS # BLD AUTO: 0.6 % (ref 0–1.8)
BASOPHILS # BLD: 0.04 K/UL (ref 0–0.12)
BILIRUB SERPL-MCNC: 0.6 MG/DL (ref 0.1–1.5)
BUN SERPL-MCNC: 17 MG/DL (ref 8–22)
CALCIUM SERPL-MCNC: 9.7 MG/DL (ref 8.5–10.5)
CHLORIDE SERPL-SCNC: 106 MMOL/L (ref 96–112)
CHOLEST SERPL-MCNC: 237 MG/DL (ref 100–199)
CO2 SERPL-SCNC: 22 MMOL/L (ref 20–33)
CREAT SERPL-MCNC: 0.83 MG/DL (ref 0.5–1.4)
EOSINOPHIL # BLD AUTO: 0.11 K/UL (ref 0–0.51)
EOSINOPHIL NFR BLD: 1.6 % (ref 0–6.9)
ERYTHROCYTE [DISTWIDTH] IN BLOOD BY AUTOMATED COUNT: 42.9 FL (ref 35.9–50)
FASTING STATUS PATIENT QL REPORTED: NORMAL
GFR SERPLBLD CREATININE-BSD FMLA CKD-EPI: 84 ML/MIN/1.73 M 2
GLOBULIN SER CALC-MCNC: 3.1 G/DL (ref 1.9–3.5)
GLUCOSE SERPL-MCNC: 79 MG/DL (ref 65–99)
HCT VFR BLD AUTO: 45.8 % (ref 37–47)
HDLC SERPL-MCNC: 77 MG/DL
HGB BLD-MCNC: 15 G/DL (ref 12–16)
IMM GRANULOCYTES # BLD AUTO: 0.02 K/UL (ref 0–0.11)
IMM GRANULOCYTES NFR BLD AUTO: 0.3 % (ref 0–0.9)
LDLC SERPL CALC-MCNC: 146 MG/DL
LYMPHOCYTES # BLD AUTO: 1.99 K/UL (ref 1–4.8)
LYMPHOCYTES NFR BLD: 29 % (ref 22–41)
MCH RBC QN AUTO: 29.9 PG (ref 27–33)
MCHC RBC AUTO-ENTMCNC: 32.8 G/DL (ref 33.6–35)
MCV RBC AUTO: 91.2 FL (ref 81.4–97.8)
MONOCYTES # BLD AUTO: 0.62 K/UL (ref 0–0.85)
MONOCYTES NFR BLD AUTO: 9 % (ref 0–13.4)
NEUTROPHILS # BLD AUTO: 4.09 K/UL (ref 2–7.15)
NEUTROPHILS NFR BLD: 59.5 % (ref 44–72)
NRBC # BLD AUTO: 0 K/UL
NRBC BLD-RTO: 0 /100 WBC
PLATELET # BLD AUTO: 192 K/UL (ref 164–446)
PMV BLD AUTO: 12.4 FL (ref 9–12.9)
POTASSIUM SERPL-SCNC: 5.6 MMOL/L (ref 3.6–5.5)
PROT SERPL-MCNC: 7.4 G/DL (ref 6–8.2)
RBC # BLD AUTO: 5.02 M/UL (ref 4.2–5.4)
SODIUM SERPL-SCNC: 141 MMOL/L (ref 135–145)
TRIGL SERPL-MCNC: 70 MG/DL (ref 0–149)
TSH SERPL DL<=0.005 MIU/L-ACNC: 1.44 UIU/ML (ref 0.38–5.33)
WBC # BLD AUTO: 6.9 K/UL (ref 4.8–10.8)

## 2022-11-02 PROCEDURE — 85025 COMPLETE CBC W/AUTO DIFF WBC: CPT

## 2022-11-02 PROCEDURE — 84443 ASSAY THYROID STIM HORMONE: CPT

## 2022-11-02 PROCEDURE — 80061 LIPID PANEL: CPT

## 2022-11-02 PROCEDURE — 90686 IIV4 VACC NO PRSV 0.5 ML IM: CPT | Performed by: NURSE PRACTITIONER

## 2022-11-02 PROCEDURE — 90471 IMMUNIZATION ADMIN: CPT | Performed by: NURSE PRACTITIONER

## 2022-11-02 PROCEDURE — 80053 COMPREHEN METABOLIC PANEL: CPT

## 2022-11-02 PROCEDURE — 82306 VITAMIN D 25 HYDROXY: CPT

## 2022-11-02 PROCEDURE — 36415 COLL VENOUS BLD VENIPUNCTURE: CPT

## 2022-11-05 ENCOUNTER — PATIENT MESSAGE (OUTPATIENT)
Dept: MEDICAL GROUP | Facility: LAB | Age: 52
End: 2022-11-05
Payer: COMMERCIAL

## 2022-11-05 DIAGNOSIS — N93.9 ABNORMAL UTERINE BLEEDING: ICD-10-CM

## 2022-11-07 ENCOUNTER — HOSPITAL ENCOUNTER (OUTPATIENT)
Dept: LAB | Facility: MEDICAL CENTER | Age: 52
End: 2022-11-07
Attending: NURSE PRACTITIONER
Payer: COMMERCIAL

## 2022-11-07 DIAGNOSIS — N93.9 UTERINE BLEEDING: ICD-10-CM

## 2022-11-07 DIAGNOSIS — E87.5 HYPERKALEMIA: ICD-10-CM

## 2022-11-07 LAB
IRON SATN MFR SERPL: 21 % (ref 15–55)
IRON SERPL-MCNC: 78 UG/DL (ref 40–170)
POTASSIUM SERPL-SCNC: 5.1 MMOL/L (ref 3.6–5.5)
TIBC SERPL-MCNC: 370 UG/DL (ref 250–450)
UIBC SERPL-MCNC: 292 UG/DL (ref 110–370)

## 2022-11-07 PROCEDURE — 84132 ASSAY OF SERUM POTASSIUM: CPT

## 2022-11-07 PROCEDURE — 83550 IRON BINDING TEST: CPT

## 2022-11-07 PROCEDURE — 83540 ASSAY OF IRON: CPT

## 2022-11-07 PROCEDURE — 36415 COLL VENOUS BLD VENIPUNCTURE: CPT

## 2022-11-22 ENCOUNTER — OFFICE VISIT (OUTPATIENT)
Dept: MEDICAL GROUP | Facility: LAB | Age: 52
End: 2022-11-22
Payer: COMMERCIAL

## 2022-11-22 VITALS
WEIGHT: 141 LBS | BODY MASS INDEX: 22.66 KG/M2 | TEMPERATURE: 96.6 F | SYSTOLIC BLOOD PRESSURE: 138 MMHG | OXYGEN SATURATION: 98 % | RESPIRATION RATE: 12 BRPM | HEIGHT: 66 IN | DIASTOLIC BLOOD PRESSURE: 72 MMHG | HEART RATE: 60 BPM

## 2022-11-22 DIAGNOSIS — R68.84 JAW PAIN: ICD-10-CM

## 2022-11-22 PROCEDURE — 99213 OFFICE O/P EST LOW 20 MIN: CPT | Performed by: NURSE PRACTITIONER

## 2022-11-22 RX ORDER — TRIAMCINOLONE ACETONIDE 55 UG/1
2 SPRAY, METERED NASAL DAILY
Qty: 16.9 ML | Refills: 4
Start: 2022-11-22 | End: 2023-06-28

## 2022-11-22 RX ORDER — FEXOFENADINE HCL 180 MG/1
180 TABLET ORAL DAILY
Qty: 30 TABLET | Refills: 4 | COMMUNITY
Start: 2022-11-22 | End: 2023-01-03

## 2022-11-22 ASSESSMENT — FIBROSIS 4 INDEX: FIB4 SCORE: 1.91

## 2022-11-22 NOTE — PROGRESS NOTES
"CC  Jaw pain       HPI:  Kary is a 52-year-old established female here with complaint of left sided jaw pain - returned about 6 weeks ago  - worse at night and in the morning.  Comes / goes intermittently throughout the day.   Advil / heating pad helps and she doesn't always wake up in the middle of the night with jaw pain.  Denies left ear pain.  Extensive w/u for this in the past year - ENT, orofacial md, MRI brain and sinuses -negative.  Given gabapentin, doxy, steroids, PT - helped.  Has not tried gabapentin, was nervous about this.  Pain is heavy / aching pain.   No pain with chewing.    Not blowing out yellow / green mucus - mucus is clear.    Has chronic environmental allergies and is currently receiving immunotherapy along with using Nasacort with Allegra.      Exam:  /72   Pulse 60   Temp 35.9 °C (96.6 °F)   Resp 12   Ht 1.676 m (5' 6\")   Wt 64 kg (141 lb)   SpO2 98%   Gen. appears healthy in no distress   Skin appropriate for sex and age   Neck trachea is midline and I do not appreciate any significantly enlarged cervical lymphadenopathy.  ENT: Tympanic membranes translucent bilaterally.  Posterior oropharynx without erythema or edema.  I do not see any dental abnormalities.  She does have tenderness to most of the left side of her left mandible with palpating underneath the bone although there is no guarding, fluctuance, crepitus or abnormalities.  Lungs unlabored breathing  Heart regular rate  Neuro gait and station normal   Psych appropriate, calm, interactive, well-groomed    A/P:  1. Jaw pain          Unfortunately becoming a recurrent issue.  Trial of 600 - 800 mg ibuprofen up to twice daily with food and high water intake for a week.  Will avoid steroids for now but certainly move forward with these in a few days if pain is worsening and ibuprofen is not helping.  Restart home PT exercises and consider formal PT (doesn't need referral, per pt).  Muscle relaxers have not helped in the " past.  Using a mouth guard.  Encouraged soft diet.  Gabapentin 100 mg nightly if pain is unrelieved by ibuprofen.  Can increase gabapentin by 100 mg every 1-3 nights if needed - max 900 mg three x daily.    Follow-up via email in 1 week.

## 2022-12-06 ENCOUNTER — PATIENT MESSAGE (OUTPATIENT)
Dept: MEDICAL GROUP | Facility: LAB | Age: 52
End: 2022-12-06
Payer: COMMERCIAL

## 2022-12-06 DIAGNOSIS — R68.84 JAW PAIN: ICD-10-CM

## 2022-12-08 ENCOUNTER — PHARMACY VISIT (OUTPATIENT)
Dept: PHARMACY | Facility: MEDICAL CENTER | Age: 52
End: 2022-12-08
Payer: COMMERCIAL

## 2022-12-08 PROCEDURE — RXMED WILLOW AMBULATORY MEDICATION CHARGE: Performed by: INTERNAL MEDICINE

## 2022-12-10 DIAGNOSIS — F41.1 GAD (GENERALIZED ANXIETY DISORDER): ICD-10-CM

## 2022-12-12 RX ORDER — ALPRAZOLAM 0.25 MG/1
TABLET ORAL
Qty: 30 TABLET | Refills: 0 | Status: SHIPPED | OUTPATIENT
Start: 2022-12-12 | End: 2023-05-02

## 2022-12-14 ENCOUNTER — HOSPITAL ENCOUNTER (OUTPATIENT)
Dept: RADIOLOGY | Facility: MEDICAL CENTER | Age: 52
End: 2022-12-14
Attending: NURSE PRACTITIONER
Payer: COMMERCIAL

## 2022-12-14 DIAGNOSIS — N93.9 ABNORMAL UTERINE BLEEDING: ICD-10-CM

## 2022-12-14 PROCEDURE — 76830 TRANSVAGINAL US NON-OB: CPT

## 2022-12-15 ENCOUNTER — TELEPHONE (OUTPATIENT)
Dept: MEDICAL GROUP | Facility: LAB | Age: 52
End: 2022-12-15
Payer: COMMERCIAL

## 2022-12-15 DIAGNOSIS — N84.0 ENDOMETRIAL POLYP: ICD-10-CM

## 2022-12-15 NOTE — TELEPHONE ENCOUNTER
Please let her know that I signed the referral and placed it to Dr. Lizeth Doyle's office which [facility] and they are excellent.

## 2023-01-03 RX ORDER — GABAPENTIN 300 MG/1
300 CAPSULE ORAL 3 TIMES DAILY
Qty: 90 CAPSULE | Refills: 3 | Status: SHIPPED | OUTPATIENT
Start: 2023-01-03 | End: 2023-04-07 | Stop reason: SDUPTHER

## 2023-02-17 ENCOUNTER — HOSPITAL ENCOUNTER (OUTPATIENT)
Dept: RADIOLOGY | Facility: MEDICAL CENTER | Age: 53
End: 2023-02-17
Attending: NURSE PRACTITIONER
Payer: COMMERCIAL

## 2023-02-17 DIAGNOSIS — R92.8 ABNORMAL MAMMOGRAM OF RIGHT BREAST: ICD-10-CM

## 2023-02-17 PROCEDURE — 76642 ULTRASOUND BREAST LIMITED: CPT | Mod: RT

## 2023-02-19 ENCOUNTER — PATIENT MESSAGE (OUTPATIENT)
Dept: MEDICAL GROUP | Facility: LAB | Age: 53
End: 2023-02-19
Payer: COMMERCIAL

## 2023-02-19 DIAGNOSIS — Z80.3 FAMILY HISTORY OF BREAST CANCER: ICD-10-CM

## 2023-03-03 ENCOUNTER — APPOINTMENT (RX ONLY)
Dept: URBAN - METROPOLITAN AREA CLINIC 4 | Facility: CLINIC | Age: 53
Setting detail: DERMATOLOGY
End: 2023-03-03

## 2023-03-03 DIAGNOSIS — F42.4 EXCORIATION (SKIN-PICKING) DISORDER: ICD-10-CM

## 2023-03-03 DIAGNOSIS — D22 MELANOCYTIC NEVI: ICD-10-CM

## 2023-03-03 DIAGNOSIS — L81.4 OTHER MELANIN HYPERPIGMENTATION: ICD-10-CM

## 2023-03-03 DIAGNOSIS — D18.0 HEMANGIOMA: ICD-10-CM

## 2023-03-03 DIAGNOSIS — L82.0 INFLAMED SEBORRHEIC KERATOSIS: ICD-10-CM

## 2023-03-03 PROBLEM — L30.9 DERMATITIS, UNSPECIFIED: Status: ACTIVE | Noted: 2023-03-03

## 2023-03-03 PROBLEM — D18.01 HEMANGIOMA OF SKIN AND SUBCUTANEOUS TISSUE: Status: ACTIVE | Noted: 2023-03-03

## 2023-03-03 PROBLEM — D22.5 MELANOCYTIC NEVI OF TRUNK: Status: ACTIVE | Noted: 2023-03-03

## 2023-03-03 PROCEDURE — 99213 OFFICE O/P EST LOW 20 MIN: CPT

## 2023-03-03 PROCEDURE — ? PHOTO-DOCUMENTATION

## 2023-03-03 PROCEDURE — ? DIAGNOSIS COMMENT

## 2023-03-03 PROCEDURE — ? COUNSELING

## 2023-03-03 ASSESSMENT — LOCATION DETAILED DESCRIPTION DERM
LOCATION DETAILED: RIGHT DISTAL PRETIBIAL REGION
LOCATION DETAILED: MIDDLE STERNUM
LOCATION DETAILED: INFERIOR THORACIC SPINE
LOCATION DETAILED: RIGHT INFERIOR CENTRAL MALAR CHEEK
LOCATION DETAILED: LEFT CENTRAL MALAR CHEEK
LOCATION DETAILED: LEFT INFERIOR LATERAL MALAR CHEEK
LOCATION DETAILED: LEFT SUPERIOR UPPER BACK
LOCATION DETAILED: LEFT DISTAL PRETIBIAL REGION
LOCATION DETAILED: RIGHT SUPERIOR LATERAL BUCCAL CHEEK

## 2023-03-03 ASSESSMENT — LOCATION SIMPLE DESCRIPTION DERM
LOCATION SIMPLE: LEFT PRETIBIAL REGION
LOCATION SIMPLE: LEFT UPPER BACK
LOCATION SIMPLE: LEFT CHEEK
LOCATION SIMPLE: RIGHT PRETIBIAL REGION
LOCATION SIMPLE: UPPER BACK
LOCATION SIMPLE: RIGHT CHEEK
LOCATION SIMPLE: CHEST

## 2023-03-03 ASSESSMENT — LOCATION ZONE DERM
LOCATION ZONE: LEG
LOCATION ZONE: FACE
LOCATION ZONE: TRUNK

## 2023-04-07 RX ORDER — ERGOCALCIFEROL 1.25 MG/1
CAPSULE ORAL
Qty: 12 CAPSULE | Refills: 3 | Status: SHIPPED | OUTPATIENT
Start: 2023-04-07 | End: 2024-01-15

## 2023-04-07 RX ORDER — GABAPENTIN 300 MG/1
CAPSULE ORAL
Qty: 90 CAPSULE | Refills: 3 | Status: SHIPPED | OUTPATIENT
Start: 2023-04-07 | End: 2023-08-01

## 2023-04-07 NOTE — TELEPHONE ENCOUNTER
Received request via: Pharmacy    Was the patient seen in the last year in this department? Yes  11/22/22  Does the patient have an active prescription (recently filled or refills available) for medication(s) requested? No    Does the patient have [facility] Plus and need 100 day supply (blood pressure, diabetes and cholesterol meds only)? Medication is not for cholesterol, blood pressure or diabetes

## 2023-04-11 ENCOUNTER — OFFICE VISIT (OUTPATIENT)
Dept: MEDICAL GROUP | Facility: LAB | Age: 53
End: 2023-04-11
Payer: COMMERCIAL

## 2023-04-11 ENCOUNTER — HOSPITAL ENCOUNTER (OUTPATIENT)
Dept: LAB | Facility: MEDICAL CENTER | Age: 53
End: 2023-04-11
Attending: NURSE PRACTITIONER
Payer: COMMERCIAL

## 2023-04-11 VITALS
HEIGHT: 66 IN | HEART RATE: 54 BPM | WEIGHT: 143 LBS | OXYGEN SATURATION: 98 % | TEMPERATURE: 97.1 F | BODY MASS INDEX: 22.98 KG/M2 | DIASTOLIC BLOOD PRESSURE: 80 MMHG | SYSTOLIC BLOOD PRESSURE: 130 MMHG

## 2023-04-11 DIAGNOSIS — M54.81 OCCIPITAL NEURALGIA OF LEFT SIDE: ICD-10-CM

## 2023-04-11 DIAGNOSIS — M13.0 MULTIPLE JOINT DISEASE: ICD-10-CM

## 2023-04-11 DIAGNOSIS — R42 DIZZINESS: ICD-10-CM

## 2023-04-11 DIAGNOSIS — G44.59 OTHER COMPLICATED HEADACHE SYNDROME: ICD-10-CM

## 2023-04-11 LAB
CRP SERPL HS-MCNC: <0.3 MG/DL (ref 0–0.75)
ERYTHROCYTE [SEDIMENTATION RATE] IN BLOOD BY WESTERGREN METHOD: 9 MM/HOUR (ref 0–25)

## 2023-04-11 PROCEDURE — 86038 ANTINUCLEAR ANTIBODIES: CPT

## 2023-04-11 PROCEDURE — 36415 COLL VENOUS BLD VENIPUNCTURE: CPT

## 2023-04-11 PROCEDURE — 86200 CCP ANTIBODY: CPT

## 2023-04-11 PROCEDURE — 86039 ANTINUCLEAR ANTIBODIES (ANA): CPT

## 2023-04-11 PROCEDURE — 86431 RHEUMATOID FACTOR QUANT: CPT

## 2023-04-11 PROCEDURE — 99214 OFFICE O/P EST MOD 30 MIN: CPT | Performed by: NURSE PRACTITIONER

## 2023-04-11 PROCEDURE — 86140 C-REACTIVE PROTEIN: CPT

## 2023-04-11 PROCEDURE — 85652 RBC SED RATE AUTOMATED: CPT

## 2023-04-11 ASSESSMENT — FIBROSIS 4 INDEX: FIB4 SCORE: 1.91

## 2023-04-11 NOTE — PROGRESS NOTES
"Chief Complaint   Patient presents with    Pain     Face, jaw.        HPI:  Kary is a 52-year-old established female here to further investigate chronic intermittent facial pain x 2-3 years - worsening - rare pain free day - gabapentin helps and she is able to avoid ibuprofen.  Taking 900 mg gabapentin per day total which keeps pain at a lower level but it returns when she tapers down on gabapentin (typically takes one during the day and two at night -).  Left side of feels heavy with drainage down back left side of throat.  No relief from allergy shots / meds in terms of drainage / sharp pain in left ear / left maxillary sinus area / scalp on left side and \"flat pain to top of head.\"  Ice packs to area help.  Mucus is clear.  +fatigue.    Weird, dizzy spells.    Last imaging - CT sinuses through ENT - told all is well except \"insignificant rice sized particle.\"  ENT wasn't sure of origin of pain.  Also neg w/u dentist and endodontist (3d jaw / teeth imaging).    Brain mri / jaw done through orofacial specialist.    Neg CT neck 3/2022.  Has intermittent headaches - worse towards end of day.    Also having diffuse joint pain - low back, hips, hands / feet x a month or two since returning from [facility] in Feb.  Her mom has several different autoimmune diseases, vitiligo, etc.      Exam:  /80 (BP Location: Left arm, Patient Position: Sitting, BP Cuff Size: Adult)   Pulse (!) 54   Temp 36.2 °C (97.1 °F) (Temporal)   Ht 1.676 m (5' 6\")   Wt 64.9 kg (143 lb)   SpO2 98%   Gen: NAD  Resp: nonlabored.  Able to speak in full sentences  ENT: Bilateral tympanic membrane translucent.  Oropharynx without erythema or edema.  No significant enlarged cervical lymphadenopathy.  Psy: pleasant / cooperative.   Neuro:  Alert and oriented x 3    A/P:  \"  1. Occipital neuralgia of left side  MR-MRA HEAD-W/O    Referral to Neurology      2. Dizziness  MR-MRA HEAD-W/O    Referral to Neurology      3. Other complicated headache " "syndrome  MR-MRA HEAD-W/O    Referral to Neurology      4. Multiple joint disease  Sed Rate    RHEUMATOID ARTHRITIS FACTOR    CCP ANTIBODY    ANTI-NUCLEAR ANTIBODY SERUM    CRP QUANTITIVE (NON-CARDIAC)      \"  Reviewed MRI of her brain in scanned in media.  Reviewed CT of her neck from 2022.  She updated me verbally regarding other specialty appointments including dentist, endodontist, etc. Recommend MRA brain and consult with neurology.  Also autoimmune lab w/u.    She does have an appointment to see Dr. Chung to discuss HRT.  Encouraged her to follow-up with me after she has seen Dr. Chung and neurology as well as have the MRI of her brain and autoimmune lab work-up.  May increase gabapentin as tolerated -by 300 mg once daily, once per week to have maximum of 900 tid.    "

## 2023-04-12 LAB — RHEUMATOID FACT SER IA-ACNC: 10 IU/ML (ref 0–14)

## 2023-04-13 LAB
CCP IGG SERPL-ACNC: 3 UNITS (ref 0–19)
NUCLEAR IGG SER QL IA: DETECTED

## 2023-04-14 LAB
ANA INTERPRETIVE COMMENT Q5143: ABNORMAL
ANA PATTERN Q5144: ABNORMAL
ANA TITER Q5145: ABNORMAL
ANTINUCLEAR ANTIBODY (ANA), HEP-2, IGG Q5142: DETECTED

## 2023-05-01 DIAGNOSIS — F41.1 GAD (GENERALIZED ANXIETY DISORDER): ICD-10-CM

## 2023-05-02 RX ORDER — ALPRAZOLAM 0.25 MG/1
TABLET ORAL
Qty: 30 TABLET | Refills: 0 | Status: SHIPPED | OUTPATIENT
Start: 2023-05-02 | End: 2023-06-01

## 2023-05-02 NOTE — TELEPHONE ENCOUNTER
Received request via: Pharmacy    Was the patient seen in the last year in this department? Yes  4/11/23  Does the patient have an active prescription (recently filled or refills available) for medication(s) requested? No    Does the patient have snf Plus and need 100 day supply (blood pressure, diabetes and cholesterol meds only)? Medication is not for cholesterol, blood pressure or diabetes

## 2023-05-12 ENCOUNTER — TELEPHONE (OUTPATIENT)
Dept: MEDICAL GROUP | Facility: LAB | Age: 53
End: 2023-05-12
Payer: COMMERCIAL

## 2023-05-12 DIAGNOSIS — R76.8 POSITIVE ANA (ANTINUCLEAR ANTIBODY): ICD-10-CM

## 2023-06-27 ASSESSMENT — RHEUMATOLOGY NEW PATIENT QUESTIONNAIRE
NIGHT SWEATS: Y
HEADACHES: Y
PRESCRIPTION, OVER THE COUNTER, OR HERBAL MEDICATIONS: SEE CHART
HEARING LOSS: Y
EYE PAIN: Y
JOINT SWELLING: Y
EAR PAIN: Y
FATIGUE: Y
SINUS PAIN: Y
NASAL CONGESTION: Y
INEFFECTIVE_MEDICATIONS: MUSCLE RELAXANTS
VERTIGO: Y
JOINT PAIN: BETTER WITH ACTIVITY
CHARACTERISTIC: BETTER WITH ACTIVITY
MARK ALL THE AREAS OF PAIN: 87472592
BODY ACHES: Y
RINGING IN EARS: Y
DURATION: >1 HOUR
RATE_1TO10: 6
LYMPH NODE SWELLING: NECK
SPASMS: Y
DIZZINESS: Y
TENDON PAIN: Y
DIFFICULTY SWALLOWING: Y
JOINT INSTABILITY: Y

## 2023-06-28 ENCOUNTER — OFFICE VISIT (OUTPATIENT)
Dept: RHEUMATOLOGY | Facility: MEDICAL CENTER | Age: 53
End: 2023-06-28
Attending: STUDENT IN AN ORGANIZED HEALTH CARE EDUCATION/TRAINING PROGRAM
Payer: COMMERCIAL

## 2023-06-28 VITALS
HEART RATE: 56 BPM | DIASTOLIC BLOOD PRESSURE: 70 MMHG | SYSTOLIC BLOOD PRESSURE: 120 MMHG | BODY MASS INDEX: 22.82 KG/M2 | OXYGEN SATURATION: 99 % | HEIGHT: 66 IN | TEMPERATURE: 98.4 F | WEIGHT: 142 LBS

## 2023-06-28 DIAGNOSIS — R76.8 SEROLOGIC AUTOIMMUNITY: ICD-10-CM

## 2023-06-28 DIAGNOSIS — M47.819 DEGENERATIVE SPINAL ARTHRITIS: ICD-10-CM

## 2023-06-28 DIAGNOSIS — M25.50 ARTHRALGIA OF MULTIPLE JOINTS: ICD-10-CM

## 2023-06-28 PROCEDURE — 3078F DIAST BP <80 MM HG: CPT | Performed by: STUDENT IN AN ORGANIZED HEALTH CARE EDUCATION/TRAINING PROGRAM

## 2023-06-28 PROCEDURE — 3074F SYST BP LT 130 MM HG: CPT | Performed by: STUDENT IN AN ORGANIZED HEALTH CARE EDUCATION/TRAINING PROGRAM

## 2023-06-28 PROCEDURE — 99204 OFFICE O/P NEW MOD 45 MIN: CPT | Performed by: STUDENT IN AN ORGANIZED HEALTH CARE EDUCATION/TRAINING PROGRAM

## 2023-06-28 PROCEDURE — 99212 OFFICE O/P EST SF 10 MIN: CPT | Performed by: STUDENT IN AN ORGANIZED HEALTH CARE EDUCATION/TRAINING PROGRAM

## 2023-06-28 RX ORDER — CETIRIZINE HYDROCHLORIDE 10 MG/1
TABLET ORAL PRN
COMMUNITY

## 2023-06-28 RX ORDER — ESTRADIOL 0.1 MG/G
CREAM VAGINAL
COMMUNITY

## 2023-06-28 RX ORDER — ESTRADIOL 0.05 MG/D
PATCH, EXTENDED RELEASE TRANSDERMAL
COMMUNITY
Start: 2023-05-02 | End: 2023-08-01

## 2023-06-28 RX ORDER — PROGESTERONE 100 MG/1
100 CAPSULE ORAL
COMMUNITY
Start: 2023-05-02

## 2023-06-28 RX ORDER — ALPRAZOLAM 0.25 MG/1
TABLET ORAL PRN
COMMUNITY
End: 2023-10-09

## 2023-06-28 RX ORDER — ASCORBIC ACID 500 MG
TABLET ORAL
COMMUNITY

## 2023-06-28 ASSESSMENT — FIBROSIS 4 INDEX: FIB4 SCORE: 1.91

## 2023-06-28 NOTE — PATIENT INSTRUCTIONS
AFTER VISIT INSTRUCTIONS    Below are important information to help you navigate your healthcare needs and help us serve you safely and effectively:  If laboratory tests and/or imaging studies were ordered, remember to go get them done as instructed.  If new prescriptions and/or refills were sent, remember to go pick them up from your local pharmacy, or call the specialty pharmacy to request shipment.  Always take your prescription medications exactly as prescribed unless instructed otherwise.  Note that antirheumatic drugs and steroids are immunosuppressive which means they increase your risk of infections and have multiple potential adverse effects on various organ systems in your body, though most of them are uncommon.  It is important that you are up-to-date on age-appropriate immunizations, particularly shingles and bacterial/viral pneumonia vaccines, which you can request from me or your primary care provider.  Be sure to read the drug package inserts to learn about the potential side effects of your medications before you start taking them.  If you experience any significant drug side effects, stop taking the medication and notify me promptly, and depending on the severity of the side effects, consider going to an urgent care or emergency department for immediate attention.  If there are significant findings on your lab tests and imaging studies that warrant further action, I will notify you with explanations via [employer] or phone call, otherwise you can view them on [employer] and let me know if you have any questions.  Note that [employer] messages are typically read during office hours and may take 1-7 business days before a response depending on the urgency of the situation and how busy my clinic schedule is.  In general, [employer] messaging is for non-urgent matters that do not require immediate attention, so for urgent matters that cannot wait, you are advised to go to an urgent care.  Lastly, you are granted  MyChart access to my documentation of your visit and are encouraged to read my note which details my assessment and plan for your condition.

## 2023-06-28 NOTE — PROGRESS NOTES
[facility] RHEUMATOLOGY  [address], [address], [place] [address]  Phone: [phone] ? Fax: [phone]    RHEUMATOLOGY NEW PATIENT VISIT NOTE      DATE OF SERVICE: 06/28/2023         Subjective     REFERRING PRACTITIONER:  TANK Petersen  [address]  [place] [address]    PATIENT IDENTIFICATION:  Kary Ziegler  [address] [place] [address]    YOB: 1970    MEDICAL RECORD NUMBER: [id number]         CHIEF COMPLAINT:   Chief Complaint   Patient presents with    New Patient     Positive MITZY (antinuclear antibody)       HISTORY OF PRESENT ILLNESS:  Kary Ziegler is a 52 y.o. female with pertinent history notable for occipital neuralgia, DJD/DDD of cervical/thoracic spine, seasonal allergies and reactive airway disease among other comorbidities. Presents for rheumatologic evaluation in the setting of positive MITZY. Reports onset of symptoms in 10/2021 with left-sided jaw/occipital head pain and in 2/2023 with joint/tendon pain/swelling sequentially involving her feet, hands (mostly PIP joints), hips, and knees in addition to her chronic back pain, over 1 hour of joint stiffness following any period of immobility that improves with activity, occasional left-sided cervical lymphadenopathy, and chronic fatigue among multiple symptoms. She has been getting allergy desensitization therapy since 8/2022 by her allergist, and notes that she tends to experience worsening of her fatigue following each round of therapy. She has been managing with ibuprofen and gabapentin (for occipital neuralgia) prescribed by her PCP which do provide some relief. Reports other aspects of her symptoms and medical history as noted on the questionnaire below.    Pertinent laboratory results: Positive MITZY 1:160 speckled pattern (in 4/2023); negative/normal anti-TPO (in 5/2015), RF, anti-CCP, ESR, CRP (in 4/2023), vitamin D, TSH, CBC and CMP (in 11/2022).    Pertinent  imaging results: Thoracic spine with mild degenerative changes (XR in 7/2017). Cervical spine with mild to moderate left posterior paramedian disc protrusion at C5-C6 which deforms the ventrolateral surface of the cord but does not result in significant central canal or neural foraminal stenosis, and mild multilevel degenerative changes (MRI in 12/2016).    [facility] New Patient History Form    6/27/2023  4:51 PM PDT - Filed by Patient   Demographic Information   Marital Status:    Occupation: Retired   Highest Level of Education: MA in Education   MAIN REASON FOR VISIT Joint pain/Fatigue/Facial Pain/ +MITZY Test   HISTORY OF PRESENT ILLNESS   Date of symptom onset: October 2021 (Jaw/Head pain)   Feb 2023 (Feet and joint pain)   Preceding incident/ailment: None   Describe/list your symptoms: Head Pain: Left side jaw pain;  occipital nerve pain;  head pain; lynphm pain. Joint Pain: Swollen aching finger joints in middle of the night and morning;  Spinal stiffness;  pain walking bare foot;  toe joint pain;  hip popping turned into hip joint pain, knees pain   Exacerbating factors: Walking w/o shoes   Alleviating factors: wearing shoes;  warm showers; lymphatic massage;  cranial sacral therapy;  stretching   Helpful medications: Ibruprofen and Gabapentin;  perhaps hormones I just started May 1   Ineffective medications: Muscle relaxants   Severity of pain (scale of 1-10): 6   Personal/emotional stressors: What isn't?   Rashaad All The Areas Of Pain    REVIEW OF SYMPTOMS    General   Fevers    Chills    Night sweats Yes   Malaise    Fatigue Yes   Unintentional weight loss    Musculoskeletal   Joint pain Better with activity   Morning stiffness duration >1 hour   Morning stiffness characteristic Better with activity   Joint swelling Yes   Joint instability Yes   Tendon pain Yes   Muscle pain    Body aches Yes   Dermatologic   Hair loss with bald spots    Hair shedding    Skin thickening    Skin plaques     Sunlight-induced skin rash    Cold-induced color changes (white, purple, red on rewarming)    Neurologic/Psychiatric   Weakness    Spasms Yes   Tingling    Burning    Numbness    Insomnia    Anxiety    Depression    Head/Eyes   Headaches Yes   Temple pain    Dizziness Yes   Dry eyes    Eye pain Yes   Eye redness    Blurry vision    Vision loss    Ears/Nose   Ear pain Yes   Ringing in ears Yes   Vertigo Yes   Hearing loss Yes   Nasal ulcers    Nosebleeds    Sinus pain Yes   Nasal congestion Yes   Snoring    Mouth/Throat   Oral ulcers    Bleeding gums    Dry mouth    Cavities    Sore throat    Sticking in throat    Difficulty speaking    Neck/Lymphatics   Thyroid pain    Thyroid swelling    Lymph node swelling Neck   Cardiac/Respiratory   Chest pain with breathing    Dry cough    Cough with bloody phlegm    Shortness of breath    Fast heartbeats    Irregular heartbeats    Gastrointestinal   Nausea    Vomiting    Difficulty swallowing Yes   Heartburn    Abdominal pain    Bloody stool    Mucus stool    Genitourinary   Pelvic pain    Genital ulcers    Abnormal discharge    Burning urination    Frothy urine    Blood in urine    MEDICAL/PERSONAL HISTORY DETAILS   Current Medical Problems: Face,foot,hip, finger, spine anival   Past/Resolved Medical Problems: See Chart   Surgeries/Procedures (include month/year): See Chart   Prescription/Over-The-Counter/Herbal Medications: See chart   Medication/Food/Material Allergies (include reactions): None   Immunizations (include month/year) See chart   Alcohol/Tobacco/Recreational Drugs: Occasional Alcohol   Family Medical History (father, mother, siblings only): Father: High cholesterol;  Mother: Alopecia Uliversalis/Cervical Dystonia/Vitaligo/Osteopenia       REVIEW OF SYSTEMS:  Except as noted in the history above, relevant review of systems with emphasis on autoimmune rheumatic diseases was otherwise negative.      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  Mom with positive HER-2 breast cancer 02/08/2021    Memory difficulties 10/12/2020    Vitamin D deficiency 12/24/2018    Reactive airway disease 04/24/2018    Degenerative disc disease, cervical 12/28/2016    Seasonal allergies 01/15/2015    Right hip pain 01/15/2015    Sleep disturbance 06/09/2014       PAST MEDICAL HISTORY:  History reviewed. No pertinent past medical history.    PAST SURGICAL HISTORY:  History reviewed. No pertinent surgical history.    MEDICATIONS:  Current Outpatient Medications   Medication Sig    FIBER SELECT GUMMIES PO 2 tablets every day by oral route.    progesterone (PROMETRIUM) 100 MG Cap 100 mg.    ascorbic acid (VITAMIN C) 500 MG tablet 1 tablet every day by oral route.    CALCIUM PO     cetirizine (ZYRTEC ALLERGY) 10 MG Tab as needed.    ALPRAZolam (XANAX) 0.25 MG Tab as needed.    diclofenac sodium (VOLTAREN) 1 % Gel     estradiol (ESTRACE) 0.1 MG/GM vaginal cream INSERT 1 GM NIGHTLY FOR 7 NIGHTS AND THEN ONLY 3 NIGHTS PER WEEK.    estradiol (VIVELLE DOT) 0.05 MG/24HR PATCH BIWEEKLY 1 patch twice a week by transderm. route.    gabapentin (NEURONTIN) 300 MG Cap TAKE 1 CAPSULE BY MOUTH THREE TIMES A DAY    vitamin D2, Ergocalciferol, (DRISDOL) 1.25 MG (03566 UT) Cap capsule TAKE 1 CAPSULE BY MOUTH ONE TIME PER WEEK       ALLERGIES:   No Known Allergies    IMMUNIZATIONS:  Immunization History   Administered Date(s) Administered    Influenza Vaccine Quad Inj (Pf) 12/28/2016, 01/24/2018, 11/16/2018, 10/21/2019, 10/12/2020, 11/15/2021, 11/02/2022    PFIZER BIVALENT SARS-COV-2 VACCINE (12+) 12/08/2022    PFIZER PURPLE CAP SARS-COV-2 VACCINATION (12+) 03/08/2021,  "03/29/2021, 10/19/2021    Tdap Vaccine 05/06/2016    Zoster Vaccine Recombinant (RZV) (SHINGRIX) 03/07/2022       SOCIAL HISTORY:   Social History     Socioeconomic History    Marital status:    Tobacco Use    Smoking status: Never    Smokeless tobacco: Never   Substance and Sexual Activity    Alcohol use: Yes     Comment: socially    Drug use: No       FAMILY HISTORY:  Family History   Problem Relation Age of Onset    Cancer Mother         breast - dx 6/2012    Other Mother         alopecia, vitiligo    Depression Mother     Anxiety disorder Mother     Genitourinary () Problems Mother         uterine fibroids - had hysterectomy    Anxiety disorder Sister     Stroke Sister 51    Cancer Maternal Grandmother         breast    Heart Disease Maternal Grandfather     Cancer Paternal Grandmother         lung - smoker            Objective     Vital Signs: /70 (BP Location: Left arm, Patient Position: Sitting, BP Cuff Size: Adult)   Pulse (!) 56   Temp 36.9 °C (98.4 °F) (Temporal)   Ht 1.664 m (5' 5.5\")   Wt 64.4 kg (142 lb)   SpO2 99% Body mass index is 23.27 kg/m².    General: Appears well and comfortable  Eyes: No scleral or conjunctival lesions  ENT: No apparent oral or nasal lesions  Head/Neck: No apparent scalp or neck lesions  Cardiovascular: Regular rate and rhythm; no pericardial rubs  Respiratory: Breathing quiet and unlabored; no rales or pleural rubs  Gastrointestinal: No apparent organomegaly or abdominal masses  Integumentary: No significant cutaneous lesions or discolorations  Musculoskeletal: No significant joint tenderness, periarticular soft tissue swelling, warmth, erythema, or overt synovitis; no significant restriction in range of motion of joints examined  Neurologic: No focal sensory or motor deficits  Psychiatric: Mood and affect appropriate      LABORATORY RESULTS REVIEWED AND INTERPRETED BY ME:  Lab Results   Component Value Date/Time    AIDENTEWES 9 04/11/2023 01:31 PM    " CREACTPROT <0.30 04/11/2023 01:31 PM     Lab Results   Component Value Date/Time    RHEUMFACTN 10 04/11/2023 01:31 PM    CCPANTIBODY 3 04/11/2023 01:31 PM     Lab Results   Component Value Date/Time    ANTINUCAB Detected (A) 04/11/2023 01:31 PM     Lab Results   Component Value Date/Time    MICROSOMALA 2.1 05/14/2015 02:30 PM    TSHULTRASEN 1.440 11/02/2022 02:30 PM    FREET4 0.77 10/21/2019 09:01 AM     Lab Results   Component Value Date/Time    25HYDROXY 47 11/02/2022 02:30 PM     Lab Results   Component Value Date/Time    FERRITIN 18.0 12/20/2018 11:08 AM    IRON 78 11/07/2022 04:06 PM     Lab Results   Component Value Date/Time    WBC 6.9 11/02/2022 02:30 PM    RBC 5.02 11/02/2022 02:30 PM    HEMOGLOBIN 15.0 11/02/2022 02:30 PM    HEMATOCRIT 45.8 11/02/2022 02:30 PM    MCV 91.2 11/02/2022 02:30 PM    MCH 29.9 11/02/2022 02:30 PM    MCHC 32.8 (L) 11/02/2022 02:30 PM    RDW 42.9 11/02/2022 02:30 PM    PLATELETCT 192 11/02/2022 02:30 PM    MPV 12.4 11/02/2022 02:30 PM    NEUTS 4.09 11/02/2022 02:30 PM    LYMPHOCYTES 29.00 11/02/2022 02:30 PM    MONOCYTES 9.00 11/02/2022 02:30 PM    EOSINOPHILS 1.60 11/02/2022 02:30 PM    BASOPHILS 0.60 11/02/2022 02:30 PM    HYPOCHROMIA 1+ 11/04/2013 09:25 AM     Lab Results   Component Value Date/Time    ASTSGOT 23 11/02/2022 02:30 PM    ALTSGPT 11 11/02/2022 02:30 PM    ALKPHOSPHAT 52 11/02/2022 02:30 PM    TBILIRUBIN 0.6 11/02/2022 02:30 PM    TOTPROTEIN 7.4 11/02/2022 02:30 PM    ALBUMIN 4.3 11/02/2022 02:30 PM     Lab Results   Component Value Date/Time    SODIUM 141 11/02/2022 02:30 PM    POTASSIUM 5.1 11/07/2022 04:06 PM    CHLORIDE 106 11/02/2022 02:30 PM    CO2 22 11/02/2022 02:30 PM    GLUCOSE 79 11/02/2022 02:30 PM    BUN 17 11/02/2022 02:30 PM    CREATININE 0.83 11/02/2022 02:30 PM    CALCIUM 9.7 11/02/2022 02:30 PM     Lab Results   Component Value Date/Time    CHOLSTRLTOT 237 (H) 11/02/2022 02:30 PM     (H) 11/02/2022 02:30 PM    HDL 77 11/02/2022 02:30 PM     TRIGLYCERIDE 70 11/02/2022 02:30 PM    HBA1C 5.2 11/13/2021 07:52 AM       RADIOLOGY RESULTS REVIEWED AND INTERPRETED BY ME:  Results for orders placed during the hospital encounter of 10/24/12    DX-JOINT SURVEY-HANDS SINGLE VIEW    Impression  Unremarkable examination.    Results for orders placed in visit on 12/22/14    DX-PELVIS-1 OR 2 VIEWS    Impression  1.  Minimal spurring of the inferior right SI joint and minimal narrowing of the right superior hip joint. Otherwise negative exam.    Results for orders placed during the hospital encounter of 12/02/16    DX-CERVICAL SPINE-2 OR 3 VIEWS    Impression  Negative cervical spine series    Results for orders placed during the hospital encounter of 12/10/16    MR-CERVICAL SPINE-W/O    Impression  1.  The C5-6 disc demonstrates mild to moderate left posterior paramedian disc protrusion which deforms the ventrolateral surface of the cord but does not result in significant central canal or neural foraminal stenosis.  2.  Additional mild multilevel degenerative changes of the cervical spine as described above.      All relevant laboratory and imaging results reported on this note were reviewed and interpreted by me.         Assessment & Plan     Kary Ziegler is a 52 y.o. female with history as noted above whose presentation merits the following diagnostic and clinical status impressions and recommendations:    1. 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 Notably, the MITZY test is highly sensitive and lacks diagnostic specificity as it can be seen in a variety of non-rheumatic conditions, such as acute or chronic viral or bacterial infections (presumably via molecular mimicry), autoimmune thyroid disease (Hashimoto's thyroiditis or  Graves' disease), autoimmune hepatobiliary disease, inflammatory bowel disease, and lymphoproliferative disease or malignancy, as well as in over 10% of healthy individuals with no clinical evidence of autoimmune rheumatic disease. In any case, it must be interpreted in the context of the overall clinical picture with close attention to disease-specific manifestations and disease-specific antibody subtypes. That said, the presence of persistently positive MITZY, especially in high or rising titers, does confer some risk of MITZY-associated disease, so if suspicious symptoms develop and persist, clinical follow-up for re-evaluation would be reasonable. In this case, reasonable to undertake the additional workup noted below for confirmatory, exclusionary, and risk stratification purposes based on which additional recommendations may be provided.  - MITZY REFLEXIVE PROFILE; Future  - THYROID PEROXIDASE  (TPO) AB; Future  - ANTITHYROGLOBULIN AB; Future  - COMPLEMENT C3; Future  - COMPLEMENT C4; Future  - COMPLEMENT TOTAL (CH50); Future    2. Arthralgia of multiple joints  Presentation has features of both undifferentiated inflammatory arthritis and biomechanical arthralgia from early osteoarthritis. In the setting of her serologic autoimmunity, need to complete her workup as noted below for risk stratification for rheumatoid arthritis and spondyloarthritis.  - CARBAMYLATED PROTEIN AB, IGG; Future  - HLA-B27; Future    3. Degenerative spinal arthritis of cervical and thoracic spine  Presumably the etiology of her waxing/waning back pain, but in the setting of her serologic autoimmunity, the possibility of spondyloarthritis cannot be entirely excluded, hence the additional testing noted.  - HLA-B27; Future      The above assessment and plan were discussed with the patient who acknowledged understanding of the plan.    FOLLOW-UP: Return for follow-up TBD.         Thank you for the opportunity to participate in the care of Kary  Jimmie Ziegler.    Mal Medellin M.D., M.S.  Rheumatologist, [facility] ? [facility]   of Clinical Medicine, Department of Internal Medicine  [facility] ? [facility]

## 2023-06-29 ENCOUNTER — HOSPITAL ENCOUNTER (OUTPATIENT)
Dept: LAB | Facility: MEDICAL CENTER | Age: 53
End: 2023-06-29
Attending: STUDENT IN AN ORGANIZED HEALTH CARE EDUCATION/TRAINING PROGRAM
Payer: COMMERCIAL

## 2023-06-29 DIAGNOSIS — R76.8 SEROLOGIC AUTOIMMUNITY: ICD-10-CM

## 2023-06-29 DIAGNOSIS — M25.50 ARTHRALGIA OF MULTIPLE JOINTS: ICD-10-CM

## 2023-06-29 DIAGNOSIS — M47.819 DEGENERATIVE SPINAL ARTHRITIS: ICD-10-CM

## 2023-06-29 LAB
C3 SERPL-MCNC: 117.5 MG/DL (ref 87–200)
C4 SERPL-MCNC: 25.4 MG/DL (ref 19–52)
THYROPEROXIDASE AB SERPL-ACNC: 19 IU/ML (ref 0–9)

## 2023-06-29 PROCEDURE — 86162 COMPLEMENT TOTAL (CH50): CPT

## 2023-06-29 PROCEDURE — 86225 DNA ANTIBODY NATIVE: CPT

## 2023-06-29 PROCEDURE — 86235 NUCLEAR ANTIGEN ANTIBODY: CPT

## 2023-06-29 PROCEDURE — 86812 HLA TYPING A B OR C: CPT

## 2023-06-29 PROCEDURE — 86800 THYROGLOBULIN ANTIBODY: CPT

## 2023-06-29 PROCEDURE — 36415 COLL VENOUS BLD VENIPUNCTURE: CPT

## 2023-06-29 PROCEDURE — 86376 MICROSOMAL ANTIBODY EACH: CPT

## 2023-06-29 PROCEDURE — 83516 IMMUNOASSAY NONANTIBODY: CPT

## 2023-06-29 PROCEDURE — 86160 COMPLEMENT ANTIGEN: CPT

## 2023-06-29 PROCEDURE — 86039 ANTINUCLEAR ANTIBODIES (ANA): CPT

## 2023-06-29 PROCEDURE — 86038 ANTINUCLEAR ANTIBODIES: CPT

## 2023-07-01 LAB
CH50 SERPL-ACNC: 58.5 U/ML (ref 38.7–89.9)
HLA-B27 QL FC: NEGATIVE
NUCLEAR IGG SER QL IA: DETECTED
THYROGLOB AB SERPL-ACNC: <0.9 IU/ML (ref 0–4)

## 2023-07-02 LAB — CARBAMYLATED PROTEIN ANTIBODY, IGG Q6043: 13 UNITS (ref 0–19)

## 2023-07-04 LAB
ENA JO1 AB TITR SER: 1 AU/ML (ref 0–40)
ENA SCL70 IGG SER QL: 20 AU/ML (ref 0–40)
ENA SM IGG SER-ACNC: 3 AU/ML (ref 0–40)
ENA SS-B IGG SER IA-ACNC: 0 AU/ML (ref 0–40)
SSA52 R0ENA AB IGG Q0420: 3 AU/ML (ref 0–40)
SSA60 R0ENA AB IGG Q0419: 1 AU/ML (ref 0–40)
U1 SNRNP IGG SER QL: 4 UNITS (ref 0–19)

## 2023-07-05 LAB — DSDNA AB TITR SER CLIF: NORMAL {TITER}

## 2023-07-07 ENCOUNTER — PATIENT MESSAGE (OUTPATIENT)
Dept: RHEUMATOLOGY | Facility: MEDICAL CENTER | Age: 53
End: 2023-07-07
Payer: COMMERCIAL

## 2023-07-18 NOTE — PATIENT COMMUNICATION
Patient calling office to follow up on unanswered [employer] messages regarding lab results.   Please advise,

## 2023-08-01 ENCOUNTER — HOSPITAL ENCOUNTER (OUTPATIENT)
Dept: LAB | Facility: MEDICAL CENTER | Age: 53
End: 2023-08-01
Attending: NURSE PRACTITIONER
Payer: COMMERCIAL

## 2023-08-01 ENCOUNTER — OFFICE VISIT (OUTPATIENT)
Dept: MEDICAL GROUP | Facility: LAB | Age: 53
End: 2023-08-01
Payer: COMMERCIAL

## 2023-08-01 VITALS
RESPIRATION RATE: 16 BRPM | WEIGHT: 142 LBS | TEMPERATURE: 96.9 F | HEART RATE: 50 BPM | DIASTOLIC BLOOD PRESSURE: 70 MMHG | HEIGHT: 66 IN | SYSTOLIC BLOOD PRESSURE: 110 MMHG | OXYGEN SATURATION: 99 % | BODY MASS INDEX: 22.82 KG/M2

## 2023-08-01 DIAGNOSIS — M25.50 ARTHRALGIA OF MULTIPLE JOINTS: ICD-10-CM

## 2023-08-01 DIAGNOSIS — R76.8 ANTI-TPO ANTIBODIES PRESENT: ICD-10-CM

## 2023-08-01 DIAGNOSIS — R76.8 SEROLOGIC AUTOIMMUNITY: ICD-10-CM

## 2023-08-01 DIAGNOSIS — M54.81 OCCIPITAL NEURALGIA OF LEFT SIDE: ICD-10-CM

## 2023-08-01 LAB
T3FREE SERPL-MCNC: 3.09 PG/ML (ref 2–4.4)
T4 FREE SERPL-MCNC: 1.3 NG/DL (ref 0.93–1.7)
TSH SERPL DL<=0.005 MIU/L-ACNC: 1.53 UIU/ML (ref 0.38–5.33)

## 2023-08-01 PROCEDURE — 84443 ASSAY THYROID STIM HORMONE: CPT

## 2023-08-01 PROCEDURE — 99214 OFFICE O/P EST MOD 30 MIN: CPT | Performed by: NURSE PRACTITIONER

## 2023-08-01 PROCEDURE — 36415 COLL VENOUS BLD VENIPUNCTURE: CPT

## 2023-08-01 PROCEDURE — 3074F SYST BP LT 130 MM HG: CPT | Performed by: NURSE PRACTITIONER

## 2023-08-01 PROCEDURE — 84481 FREE ASSAY (FT-3): CPT

## 2023-08-01 PROCEDURE — 3078F DIAST BP <80 MM HG: CPT | Performed by: NURSE PRACTITIONER

## 2023-08-01 PROCEDURE — 84439 ASSAY OF FREE THYROXINE: CPT

## 2023-08-01 RX ORDER — TOPIRAMATE 25 MG/1
TABLET ORAL
COMMUNITY
Start: 2023-07-11

## 2023-08-01 RX ORDER — ESTRADIOL 0.05 MG/D
PATCH TRANSDERMAL
COMMUNITY
Start: 2023-05-02

## 2023-08-01 ASSESSMENT — FIBROSIS 4 INDEX: FIB4 SCORE: 1.91

## 2023-08-01 ASSESSMENT — PATIENT HEALTH QUESTIONNAIRE - PHQ9: CLINICAL INTERPRETATION OF PHQ2 SCORE: 0

## 2023-08-01 NOTE — PROGRESS NOTES
"CC  Lab f/u      HPI:  Kary is a 52-year-old established female here to follow-up on recent consult with rheumatology / neurology, initially instigated because of positive MITZY found after chronic pain to left jaw/occipital area, swelling in feet/hands/pain in hips/knees and back.  TPO did return positive at 19, was negative 8 years ago on last check.  Last TSH within normal limits at 1.4, 9 months ago and has been within normal limits for the past 7 years on lab flowsheet.  Fatigued, hot easily, joint pain.  Denies constipation.  Has gained about 8-10 lbs in the past few months which she attributes to menopause.    Off gabapentin.  Facial pain still present.    Also saw Dr. Duarte - rx topamax for migraines but not taking this.      Exam:  /70 (BP Location: Right arm, Patient Position: Sitting, BP Cuff Size: Adult)   Pulse (!) 50   Temp 36.1 °C (96.9 °F)   Resp 16   Ht 1.676 m (5' 6\")   Wt 64.4 kg (142 lb)   SpO2 99%   Gen: NAD  Resp: nonlabored.  Able to speak in full sentences  ENT:  left TM - middle ear effusion present but TM is translucent / non-perforated and is not erythematous.  Right TM - translucent without abnormalities.   Psy: pleasant / cooperative.   Neuro:  Alert and oriented x 3    A/P:  1. Anti-TPO antibodies present  -reviewed recent notes / labs through rheumatology.  Recommend updated thyroid hormones as below.  Discussed increased risk of developing autoimmune thyroiditis over the next several years because of positive TPO and encouraged her to monitor thyroid labs every 6 months, sooner if any of the below labs are abnormal.  Discussed signs and symptoms of hypo versus hyperthyroidism.  - TSH; Standing  - FREE THYROXINE; Standing  - T3 FREE; Standing    2. 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    3. Arthralgia of multiple joints  -Fortunately negative work-up for Sjogren's disease, PMR and ankylosing spondylitis.    She " was given Topamax by neurology and I discussed side effects as well as benefits of Topamax.  She will let me know how she is doing on Topamax if she decides to start it.  She is off of gabapentin that was removed from her medication list.

## 2023-08-22 ENCOUNTER — HOSPITAL ENCOUNTER (OUTPATIENT)
Dept: RADIOLOGY | Facility: MEDICAL CENTER | Age: 53
End: 2023-08-22
Attending: NURSE PRACTITIONER
Payer: COMMERCIAL

## 2023-08-22 DIAGNOSIS — R92.8 ABNORMAL FINDING ON RADIOLOGICAL EXAMINATION OF BREAST: ICD-10-CM

## 2023-08-22 DIAGNOSIS — R92.8 FOLLOW-UP EXAMINATION OF ABNORMAL MAMMOGRAM: ICD-10-CM

## 2023-08-22 PROCEDURE — 76642 ULTRASOUND BREAST LIMITED: CPT | Mod: RT

## 2023-08-22 PROCEDURE — G0279 TOMOSYNTHESIS, MAMMO: HCPCS

## 2023-10-09 DIAGNOSIS — G47.9 SLEEP DISTURBANCE: ICD-10-CM

## 2023-10-09 RX ORDER — ALPRAZOLAM 0.25 MG/1
TABLET ORAL
Qty: 30 TABLET | Refills: 0 | Status: SHIPPED | OUTPATIENT
Start: 2023-10-09 | End: 2024-02-05

## 2023-10-09 NOTE — TELEPHONE ENCOUNTER
Received request via: Pharmacy    Was the patient seen in the last year in this department? Yes  LOV 08/01/2023  Does the patient have an active prescription (recently filled or refills available) for medication(s) requested? No    Does the patient have FPC Plus and need 100 day supply (blood pressure, diabetes and cholesterol meds only)? Medication is not for cholesterol, blood pressure or diabetes and Patient does not have SCP

## 2023-10-23 ENCOUNTER — OFFICE VISIT (OUTPATIENT)
Dept: MEDICAL GROUP | Facility: LAB | Age: 53
End: 2023-10-23
Payer: COMMERCIAL

## 2023-10-23 VITALS
SYSTOLIC BLOOD PRESSURE: 110 MMHG | RESPIRATION RATE: 12 BRPM | HEART RATE: 52 BPM | BODY MASS INDEX: 22.34 KG/M2 | WEIGHT: 139 LBS | DIASTOLIC BLOOD PRESSURE: 62 MMHG | TEMPERATURE: 97.4 F | OXYGEN SATURATION: 95 % | HEIGHT: 66 IN

## 2023-10-23 DIAGNOSIS — L02.92 BOIL: ICD-10-CM

## 2023-10-23 DIAGNOSIS — Z00.00 PREVENTATIVE HEALTH CARE: ICD-10-CM

## 2023-10-23 DIAGNOSIS — G89.29 CHRONIC FACE PAIN: ICD-10-CM

## 2023-10-23 DIAGNOSIS — R51.9 CHRONIC FACE PAIN: ICD-10-CM

## 2023-10-23 DIAGNOSIS — E78.49 OTHER HYPERLIPIDEMIA: ICD-10-CM

## 2023-10-23 PROCEDURE — 99214 OFFICE O/P EST MOD 30 MIN: CPT | Performed by: NURSE PRACTITIONER

## 2023-10-23 PROCEDURE — 3078F DIAST BP <80 MM HG: CPT | Performed by: NURSE PRACTITIONER

## 2023-10-23 PROCEDURE — 3074F SYST BP LT 130 MM HG: CPT | Performed by: NURSE PRACTITIONER

## 2023-10-23 RX ORDER — SULFAMETHOXAZOLE AND TRIMETHOPRIM 800; 160 MG/1; MG/1
1 TABLET ORAL 2 TIMES DAILY
Qty: 10 TABLET | Refills: 0 | Status: SHIPPED | OUTPATIENT
Start: 2023-10-23 | End: 2023-10-28

## 2023-10-23 ASSESSMENT — FIBROSIS 4 INDEX: FIB4 SCORE: 1.91

## 2023-10-23 NOTE — PROGRESS NOTES
"Chief Complaint   Patient presents with    Bump     Groin area X 3 weeks       HPI:  Kary is a 51 yo est female here with c/o new onset bumps to groin twice in the past months.  +pain.  One opened a few d ago and d/c may have been yellow.  Denies other associated symptoms.  No hx of same.  She is not diabetic.    Only thing new is hrt starting 5/2023 - estrogen patch / progesterone pill and vaginal estrogen - working well.       Facial pain:  scheduled for EEG with Dr. Duarte and another opinion with ent in Dec.  Off gabapentin and back to advil daily / using ice / pain bringing her tears.  Getting lymphatic massage. Scheduled for consult with dentist for botox re: possible TMJ.        Exam:   /62 (BP Location: Right arm, Patient Position: Sitting, BP Cuff Size: Adult)   Pulse (!) 52   Temp 36.3 °C (97.4 °F)   Resp 12   Ht 1.676 m (5' 6\")   Wt 63 kg (139 lb)   LMP 10/01/2023   SpO2 95%   BMI 22.44 kg/m²     Gen: NAD  Resp: nonlabored.  Able to speak in full sentences  Psy: pleasant / cooperative.   : opened previous boil to left medial groin without surrounding erythema or any discharge present.  Firm 1 cm slightly tender boil without fluctuance or induration to right medial groin.    Neuro:  Alert and oriented x 3    Assessment / Plan:  1. Boil  - sulfamethoxazole-trimethoprim (BACTRIM DS) 800-160 MG tablet; Take 1 Tablet by mouth 2 times a day for 5 days.  Dispense: 10 Tablet; Refill: 0    2. Other hyperlipidemia -negative CT cardiac score 2021    3. Chronic face pain    4. Preventative health care  - Comp Metabolic Panel; Future  - Lipid Profile; Future  - TSH; Future  - FREE THYROXINE; Future  - T3 FREE; Future  - CBC WITH DIFFERENTIAL; Future       Bactrim ds bid x 5 d.  Encouraged warm bath soaks.  F/u one week if not resolved - sooner with enlargement.  Ok to use topical neosporin to opened, healing boil.  Discussed possible causes and need for f/u if persistent as we can use topical " clindamycin long term for prevention.     Facial pain persistent - followed by Dr Duarte, pending new consult with ENT and her dentist regarding possible botox for TMJ.      Recommend full updated lab panel.

## 2023-12-11 ENCOUNTER — HOSPITAL ENCOUNTER (OUTPATIENT)
Dept: RADIOLOGY | Facility: MEDICAL CENTER | Age: 53
End: 2023-12-11
Attending: OBSTETRICS & GYNECOLOGY
Payer: COMMERCIAL

## 2023-12-11 DIAGNOSIS — Z80.3 FAMILY HISTORY OF BREAST CANCER: ICD-10-CM

## 2023-12-11 PROCEDURE — C8908 MRI W/O FOL W/CONT, BREAST,: HCPCS

## 2023-12-11 PROCEDURE — 700117 HCHG RX CONTRAST REV CODE 255: Performed by: OBSTETRICS & GYNECOLOGY

## 2023-12-11 PROCEDURE — A9579 GAD-BASE MR CONTRAST NOS,1ML: HCPCS | Performed by: OBSTETRICS & GYNECOLOGY

## 2023-12-11 RX ADMIN — GADOTERIDOL 13 ML: 279.3 INJECTION, SOLUTION INTRAVENOUS at 14:00

## 2023-12-18 ENCOUNTER — HOSPITAL ENCOUNTER (OUTPATIENT)
Dept: LAB | Facility: MEDICAL CENTER | Age: 53
End: 2023-12-18
Attending: NURSE PRACTITIONER
Payer: COMMERCIAL

## 2023-12-18 DIAGNOSIS — Z00.00 PREVENTATIVE HEALTH CARE: ICD-10-CM

## 2023-12-18 LAB
ALBUMIN SERPL BCP-MCNC: 4.4 G/DL (ref 3.2–4.9)
ALBUMIN/GLOB SERPL: 1.5 G/DL
ALP SERPL-CCNC: 52 U/L (ref 30–99)
ALT SERPL-CCNC: 16 U/L (ref 2–50)
ANION GAP SERPL CALC-SCNC: 9 MMOL/L (ref 7–16)
AST SERPL-CCNC: 19 U/L (ref 12–45)
BASOPHILS # BLD AUTO: 1.1 % (ref 0–1.8)
BASOPHILS # BLD: 0.06 K/UL (ref 0–0.12)
BILIRUB SERPL-MCNC: 0.4 MG/DL (ref 0.1–1.5)
BUN SERPL-MCNC: 18 MG/DL (ref 8–22)
CALCIUM ALBUM COR SERPL-MCNC: 9.2 MG/DL (ref 8.5–10.5)
CALCIUM SERPL-MCNC: 9.5 MG/DL (ref 8.5–10.5)
CHLORIDE SERPL-SCNC: 105 MMOL/L (ref 96–112)
CHOLEST SERPL-MCNC: 256 MG/DL (ref 100–199)
CO2 SERPL-SCNC: 25 MMOL/L (ref 20–33)
CREAT SERPL-MCNC: 0.86 MG/DL (ref 0.5–1.4)
EOSINOPHIL # BLD AUTO: 0.14 K/UL (ref 0–0.51)
EOSINOPHIL NFR BLD: 2.6 % (ref 0–6.9)
ERYTHROCYTE [DISTWIDTH] IN BLOOD BY AUTOMATED COUNT: 41.9 FL (ref 35.9–50)
FASTING STATUS PATIENT QL REPORTED: NORMAL
GFR SERPLBLD CREATININE-BSD FMLA CKD-EPI: 80 ML/MIN/1.73 M 2
GLOBULIN SER CALC-MCNC: 3 G/DL (ref 1.9–3.5)
GLUCOSE SERPL-MCNC: 84 MG/DL (ref 65–99)
HCT VFR BLD AUTO: 43.9 % (ref 37–47)
HDLC SERPL-MCNC: 87 MG/DL
HGB BLD-MCNC: 14.7 G/DL (ref 12–16)
IMM GRANULOCYTES # BLD AUTO: 0.01 K/UL (ref 0–0.11)
IMM GRANULOCYTES NFR BLD AUTO: 0.2 % (ref 0–0.9)
LDLC SERPL CALC-MCNC: 155 MG/DL
LYMPHOCYTES # BLD AUTO: 2.09 K/UL (ref 1–4.8)
LYMPHOCYTES NFR BLD: 38.1 % (ref 22–41)
MCH RBC QN AUTO: 29.9 PG (ref 27–33)
MCHC RBC AUTO-ENTMCNC: 33.5 G/DL (ref 32.2–35.5)
MCV RBC AUTO: 89.4 FL (ref 81.4–97.8)
MONOCYTES # BLD AUTO: 0.53 K/UL (ref 0–0.85)
MONOCYTES NFR BLD AUTO: 9.7 % (ref 0–13.4)
NEUTROPHILS # BLD AUTO: 2.65 K/UL (ref 1.82–7.42)
NEUTROPHILS NFR BLD: 48.3 % (ref 44–72)
NRBC # BLD AUTO: 0 K/UL
NRBC BLD-RTO: 0 /100 WBC (ref 0–0.2)
PLATELET # BLD AUTO: 180 K/UL (ref 164–446)
PMV BLD AUTO: 11.6 FL (ref 9–12.9)
POTASSIUM SERPL-SCNC: 4.3 MMOL/L (ref 3.6–5.5)
PROT SERPL-MCNC: 7.4 G/DL (ref 6–8.2)
RBC # BLD AUTO: 4.91 M/UL (ref 4.2–5.4)
SODIUM SERPL-SCNC: 139 MMOL/L (ref 135–145)
T3FREE SERPL-MCNC: 2.88 PG/ML (ref 2–4.4)
T4 FREE SERPL-MCNC: 1.43 NG/DL (ref 0.93–1.7)
TRIGL SERPL-MCNC: 68 MG/DL (ref 0–149)
TSH SERPL DL<=0.005 MIU/L-ACNC: 3.03 UIU/ML (ref 0.38–5.33)
WBC # BLD AUTO: 5.5 K/UL (ref 4.8–10.8)

## 2023-12-18 PROCEDURE — 80053 COMPREHEN METABOLIC PANEL: CPT

## 2023-12-18 PROCEDURE — 84481 FREE ASSAY (FT-3): CPT

## 2023-12-18 PROCEDURE — 36415 COLL VENOUS BLD VENIPUNCTURE: CPT

## 2023-12-18 PROCEDURE — 84443 ASSAY THYROID STIM HORMONE: CPT

## 2023-12-18 PROCEDURE — 84439 ASSAY OF FREE THYROXINE: CPT

## 2023-12-18 PROCEDURE — 80061 LIPID PANEL: CPT

## 2023-12-18 PROCEDURE — 85025 COMPLETE CBC W/AUTO DIFF WBC: CPT

## 2024-01-03 ENCOUNTER — HOSPITAL ENCOUNTER (OUTPATIENT)
Dept: RADIOLOGY | Facility: MEDICAL CENTER | Age: 54
End: 2024-01-03
Attending: OBSTETRICS & GYNECOLOGY
Payer: COMMERCIAL

## 2024-01-03 PROCEDURE — 76642 ULTRASOUND BREAST LIMITED: CPT | Mod: RT

## 2024-01-15 RX ORDER — ERGOCALCIFEROL 1.25 MG/1
CAPSULE ORAL
Qty: 12 CAPSULE | Refills: 3 | Status: SHIPPED | OUTPATIENT
Start: 2024-01-15

## 2024-02-03 DIAGNOSIS — G47.9 SLEEP DISTURBANCE: ICD-10-CM

## 2024-02-05 RX ORDER — ALPRAZOLAM 0.25 MG/1
TABLET ORAL
Qty: 30 TABLET | Refills: 0 | Status: SHIPPED | OUTPATIENT
Start: 2024-02-05 | End: 2024-03-06

## 2024-02-05 NOTE — TELEPHONE ENCOUNTER
Received request via: Pharmacy    Was the patient seen in the last year in this department? Yes    Does the patient have an active prescription (recently filled or refills available) for medication(s) requested? No    Pharmacy Name: [facility]    Does the patient have long term Plus and need 100 day supply (blood pressure, diabetes and cholesterol meds only)? Patient does not have SCP

## 2024-03-12 ENCOUNTER — APPOINTMENT (RX ONLY)
Dept: URBAN - METROPOLITAN AREA CLINIC 4 | Facility: CLINIC | Age: 54
Setting detail: DERMATOLOGY
End: 2024-03-12

## 2024-03-12 DIAGNOSIS — D22 MELANOCYTIC NEVI: ICD-10-CM

## 2024-03-12 DIAGNOSIS — L82.1 OTHER SEBORRHEIC KERATOSIS: ICD-10-CM

## 2024-03-12 DIAGNOSIS — L81.4 OTHER MELANIN HYPERPIGMENTATION: ICD-10-CM

## 2024-03-12 DIAGNOSIS — L98.8 OTHER SPECIFIED DISORDERS OF THE SKIN AND SUBCUTANEOUS TISSUE: ICD-10-CM

## 2024-03-12 PROBLEM — D22.5 MELANOCYTIC NEVI OF TRUNK: Status: ACTIVE | Noted: 2024-03-12

## 2024-03-12 PROCEDURE — ? TREATMENT REGIMEN

## 2024-03-12 PROCEDURE — ? PRESCRIPTION

## 2024-03-12 PROCEDURE — 99213 OFFICE O/P EST LOW 20 MIN: CPT

## 2024-03-12 RX ORDER — TRETINOIN 1 MG/G
GEL TOPICAL
Qty: 45 | Refills: 6 | Status: ERX | COMMUNITY
Start: 2024-03-12

## 2024-03-12 RX ADMIN — TRETINOIN: 1 GEL TOPICAL at 00:00

## 2024-03-12 ASSESSMENT — LOCATION ZONE DERM
LOCATION ZONE: FACE
LOCATION ZONE: TRUNK

## 2024-03-12 ASSESSMENT — LOCATION DETAILED DESCRIPTION DERM
LOCATION DETAILED: RIGHT SUPERIOR LATERAL MIDBACK
LOCATION DETAILED: INFERIOR MID FOREHEAD
LOCATION DETAILED: LEFT MID-UPPER BACK
LOCATION DETAILED: LEFT SUPERIOR UPPER BACK

## 2024-03-12 ASSESSMENT — LOCATION SIMPLE DESCRIPTION DERM
LOCATION SIMPLE: RIGHT LOWER BACK
LOCATION SIMPLE: INFERIOR FOREHEAD
LOCATION SIMPLE: LEFT UPPER BACK

## 2024-04-23 DIAGNOSIS — G47.9 SLEEP DISTURBANCE: ICD-10-CM

## 2024-04-23 RX ORDER — ALPRAZOLAM 0.25 MG/1
TABLET ORAL
Qty: 30 TABLET | Refills: 0 | Status: SHIPPED | OUTPATIENT
Start: 2024-04-23 | End: 2024-05-23

## 2024-04-29 ENCOUNTER — OFFICE VISIT (OUTPATIENT)
Dept: MEDICAL GROUP | Facility: LAB | Age: 54
End: 2024-04-29
Payer: COMMERCIAL

## 2024-04-29 ENCOUNTER — HOSPITAL ENCOUNTER (OUTPATIENT)
Dept: LAB | Facility: MEDICAL CENTER | Age: 54
End: 2024-04-29
Attending: NURSE PRACTITIONER
Payer: COMMERCIAL

## 2024-04-29 VITALS
WEIGHT: 142 LBS | RESPIRATION RATE: 12 BRPM | DIASTOLIC BLOOD PRESSURE: 80 MMHG | HEART RATE: 52 BPM | HEIGHT: 66 IN | SYSTOLIC BLOOD PRESSURE: 124 MMHG | OXYGEN SATURATION: 98 % | TEMPERATURE: 97 F | BODY MASS INDEX: 22.82 KG/M2

## 2024-04-29 DIAGNOSIS — R53.83 OTHER FATIGUE: ICD-10-CM

## 2024-04-29 DIAGNOSIS — R51.9 CHRONIC FACIAL PAIN: ICD-10-CM

## 2024-04-29 DIAGNOSIS — R63.5 WEIGHT GAIN: ICD-10-CM

## 2024-04-29 DIAGNOSIS — R41.89 BRAIN FOG: ICD-10-CM

## 2024-04-29 DIAGNOSIS — M25.50 ARTHRALGIA, UNSPECIFIED JOINT: ICD-10-CM

## 2024-04-29 DIAGNOSIS — R76.8 ANTI-TPO ANTIBODIES PRESENT: ICD-10-CM

## 2024-04-29 DIAGNOSIS — G89.29 CHRONIC FACIAL PAIN: ICD-10-CM

## 2024-04-29 LAB
T3FREE SERPL-MCNC: 2.8 PG/ML (ref 2–4.4)
T4 FREE SERPL-MCNC: 1.24 NG/DL (ref 0.93–1.7)
TSH SERPL DL<=0.005 MIU/L-ACNC: 1.61 UIU/ML (ref 0.38–5.33)

## 2024-04-29 PROCEDURE — 84439 ASSAY OF FREE THYROXINE: CPT

## 2024-04-29 PROCEDURE — 36415 COLL VENOUS BLD VENIPUNCTURE: CPT

## 2024-04-29 PROCEDURE — 84443 ASSAY THYROID STIM HORMONE: CPT

## 2024-04-29 PROCEDURE — 84481 FREE ASSAY (FT-3): CPT

## 2024-04-29 ASSESSMENT — PATIENT HEALTH QUESTIONNAIRE - PHQ9: CLINICAL INTERPRETATION OF PHQ2 SCORE: 0

## 2024-04-29 ASSESSMENT — FIBROSIS 4 INDEX: FIB4 SCORE: 1.425

## 2024-04-29 NOTE — PROGRESS NOTES
Verbal consent was acquired by the patient to use [employer] ambient listening note generation during this visit Yes      Subjective   Kary Ziegler is a 53 y.o. female who presents for f/u  History of Present Illness  The patient presents for evaluation of multiple medical concerns.    The patient expresses concern about positive TPO, questioning whether this could indicate Hashimoto's disease. She reports experiencing numerous symptoms indicative of hypothyroidism or Hashimoto's thyroiditis, despite her thyroid labs being normal in 12/2023. Additionally, she reports symptoms of weight gain, fatigue, multiple joint pain, cognitive fog, and attention deficit.  Requesting a referral to see endocrinology in [place].    The patient has been experiencing facial pain for the past 2.5 years, which has progressively worsened over the past few weeks. Despite taking Advil every 12 hours, the pain persists, leading her to take 2 Advil more frequently. She describes the pain as more intense, likening it to lumps and swelling, predominantly on the left side of her face, pointing to jaw and ear. This morning, she experienced bilateral facial pain and a sensation of heat. She denies any throat pain, but reports pain under her jaw and tongue. She also experiences a sharp twinge in her ear. The sharp nerve pain up through her head has lessened. She has consulted with three ENTs and dentists, neurology as well but no definitive diagnosis has been made. Dr. Duarte suspects a migraine variant or atypical trigeminal neuralgia. The oral facial doctor initially diagnosed her with occipital neuralgia and TMJ symptoms. She took gabapentin three times a day for 6 months, which provided some relief, but the pain persisted, leading her to discontinue its use. She experienced sluggishness and weight gain while on gabapentin. She was prescribed Atrovent nasal spray by her ENT specialist, which provided relief for 3 weeks in 12/2023.   "She has not consulted with Dr. Duarte since her last visit. Her brain MRI was normal. She tried Topamax for one day, but it induced drowsiness and she had to sleep on the couch all day. She retired during the pandemic. She took 1 full Xanax at night for 3 consecutive days without taking Advil which was helpful to lessen the pain. She reports fullness in her ear and sharp pain in her left jaw while eating only initially and then this quickly goes away as she continues to eat.  She also reports pain in her right lower cheek and neck, but denies any numbness, tingling, or weakness in her left arm. She describes her neck as achy, but not tingling.    Review of Systems  Negative except for HPI  Objective   /80 (BP Location: Left arm, Patient Position: Sitting, BP Cuff Size: Adult)   Pulse (!) 52   Temp 36.1 °C (97 °F)   Resp 12   Ht 1.676 m (5' 6\")   Wt 64.4 kg (142 lb)   SpO2 98%   Physical Exam  Gen. appears healthy in no distress   Skin appropriate for sex and age   Neck trachea is midline.  No significantly enlarged lymphadenopathy.  No thyromegaly appreciated.  ENT: Tympanic membranes translucent bilaterally.  Oropharynx without erythema, exudate or edema.  Lungs unlabored breathing  Heart regular rate  Neuro gait and station normal   Psych appropriate, calm, interactive, well-groomed      Assessment & Plan  #1-positive TPO:  A referral to Dr. Angelita Richardson, an endocrinologist, has been made per patient request.  Recommend repeat TSH, T4 and T3 today in our lobby.    2. Chronic facial pain.  Uncertain exact etiology.  Prefers to refrain from any further medication trials at this time and would like a second opinion through neurology.  She has been through numerous ENT, oral surgeons, dentist and 1 neurologist without a definitive diagnoses.  We briefly discussed Trokendi, Tegretol, etc. although she again she prefers to refrain from a different medication trial at this time and would like to consult a " neurologist for second opinion.  [facility]erral [facility].       Total face to face time over 25 minutes of which over 50% of this visit is spent in counseling, education and outlining a plan of treatment and coordination of care for the above conditions. This included but was not limited to discussion of medication options and potential risks related to the medications, referral and specialty care options. All patient questions were answered          Please note that this dictation was created using voice recognition software. I have made every reasonable attempt to correct obvious errors, but I expect that there are errors of grammar and possibly content that I did not discover before finalizing the note.

## 2024-04-30 ENCOUNTER — APPOINTMENT (OUTPATIENT)
Dept: MEDICAL GROUP | Facility: LAB | Age: 54
End: 2024-04-30
Payer: COMMERCIAL

## 2024-06-10 DIAGNOSIS — G47.9 SLEEP DISTURBANCE: ICD-10-CM

## 2024-06-10 RX ORDER — ALPRAZOLAM 0.25 MG/1
TABLET ORAL
Qty: 30 TABLET | Refills: 0 | Status: SHIPPED | OUTPATIENT
Start: 2024-06-10 | End: 2024-07-10

## 2024-08-05 ENCOUNTER — OFFICE VISIT (OUTPATIENT)
Dept: MEDICAL GROUP | Facility: LAB | Age: 54
End: 2024-08-05
Payer: COMMERCIAL

## 2024-08-05 VITALS
OXYGEN SATURATION: 98 % | HEIGHT: 66 IN | RESPIRATION RATE: 12 BRPM | DIASTOLIC BLOOD PRESSURE: 70 MMHG | SYSTOLIC BLOOD PRESSURE: 120 MMHG | HEART RATE: 50 BPM | WEIGHT: 138 LBS | BODY MASS INDEX: 22.18 KG/M2 | TEMPERATURE: 96.3 F

## 2024-08-05 DIAGNOSIS — G47.9 SLEEP DISTURBANCE: ICD-10-CM

## 2024-08-05 DIAGNOSIS — S91.001S ANKLE WOUND, RIGHT, SEQUELA: ICD-10-CM

## 2024-08-05 DIAGNOSIS — N95.0 POST-MENOPAUSAL BLEEDING: ICD-10-CM

## 2024-08-05 DIAGNOSIS — Z00.00 PREVENTATIVE HEALTH CARE: ICD-10-CM

## 2024-08-05 DIAGNOSIS — Z79.890 HORMONE REPLACEMENT THERAPY (HRT): ICD-10-CM

## 2024-08-05 PROCEDURE — 3074F SYST BP LT 130 MM HG: CPT | Performed by: NURSE PRACTITIONER

## 2024-08-05 PROCEDURE — 3078F DIAST BP <80 MM HG: CPT | Performed by: NURSE PRACTITIONER

## 2024-08-05 PROCEDURE — 99214 OFFICE O/P EST MOD 30 MIN: CPT | Performed by: NURSE PRACTITIONER

## 2024-08-05 RX ORDER — ALPRAZOLAM 0.25 MG
0.25 TABLET ORAL NIGHTLY PRN
Qty: 30 TABLET | Refills: 2 | Status: SHIPPED | OUTPATIENT
Start: 2024-08-05 | End: 2024-09-04

## 2024-08-05 ASSESSMENT — FIBROSIS 4 INDEX: FIB4 SCORE: 1.425

## 2024-08-05 NOTE — PROGRESS NOTES
Verbal consent was acquired by the patient to use [employer] ambient listening note generation during this visit Yes      Subjective   Kary Ziegler is a 53 y.o. female who presents for a few issues:   History of Present Illness  The patient presents for evaluation of multiple medical concerns.    She scheduled an appointment with me last Friday for a wound examination due to a cut/infection on right ankle. The injury occurred on 07/23/2024, which she believes was caused by her hiking boot rubbing against her socks. She has been applying Neosporin, a Band-Aid, and wound care spray to the wound. The wound appears to be improving, with redness around it. She denies experiencing body aches, fevers, or chills.    She is requesting a refill of her Xanax prescription. She has started using it once a day at night, either half or a full tablet. Initially, she used Xanax as needed, but later started using it automatically as it seemed to alleviate her facial pain.  She is trending backwards towards using it only as needed.  Denies any negative side effects of alprazolam.    She underwent a diagnostic mammogram in 08/2023, followed by an ultrasound in 12/2023, and a breast MRI about a month ago at [facility]. The results were normal.     She has no concerns about HIV or hepatitis C and has never had pneumonia. She is not exposed to blood and body fluids and does not typically travel internationally. She has never undergone any surgeries.    She is using a Climara patch, oral progesterone, and vaginal estrogen. She does not use the vaginal cream regularly. Her menstrual cycles were irregular, and she did not stop menstruating for 12 months before starting hormone replacement therapy. She had her annual exam 3 months ago, during which she reported spotting. She sometimes misses her patch /progesterone pill timing within about an hour of each other.       Review of Systems  Negative except for HPI  Objective   BP  "120/70 (BP Location: Right arm, Patient Position: Sitting, BP Cuff Size: Adult)   Pulse (!) 50   Temp (!) 35.7 °C (96.3 °F)   Resp 12   Ht 1.676 m (5' 6\")   Wt 62.6 kg (138 lb)   SpO2 98%   Physical Exam  Gen. appears healthy in no distress   Skin appropriate for sex and age.  She has a 1 cm by about 9 to 10 scabbed lesion to her right lateral ankle without surrounding erythema, open wound or fluctuance.  Neck trachea is midline  Lungs unlabored breathing  Heart regular rate  Neuro gait and station normal   Psych appropriate, calm, interactive, well-groomed    Results  Laboratory Studies  FSH was in the 40s in 2020.    Imaging  Breast MRI showed a tiny 3 mm enhancing mass, likely a cyst or fluid filled.  Repeat MRI was negative in July 2024       Assessment & Plan  1. Right ankle wound.  The wound appears healthy with no signs of infection, albeit dry. The application of Aquaphor or Vaseline was recommended to aid the skin healing, promoting the scab to form and resolve the issue. The wound was photographed for future reference.    2. Anxiety.  A prescription for Xanax was renewed. She was informed about the addictive nature of Xanax and advised to avoid it within 6 to 8 hours of driving or consuming alcohol.  She understands that she should not take Xanax within 6 hours of driving a car, operating machinery or drinking alcohol.  She understands the chemically dependent nature of Xanax.  In prescribing controlled substances to this patient, I certify that I have obtained and reviewed the medical history of Kary Ziegler. I have also made a good lisette effort to obtain applicable records from other providers who have treated the patient and records did not demonstrate any increased risk of substance abuse that would prevent me from prescribing controlled substances.     I have conducted a physical exam and documented it. I have reviewed Ms. Ziegler’s prescription history as maintained by the [place] " Prescription Monitoring Program.     I have assessed the patient’s risk for abuse, dependency, and addiction using the validated Opioid Risk Tool available at https://www.mdcalc.com/dugned-qwyz-mwpq-ort-narcotic-abuse.           3.  Abnormal mammogram:  A diagnostic mammogram followed by an ultrasound in 08/2023 and a breast MRI in 12/2023 revealed a benign 3 mm enhancing mass. Biopsies were deemed unnecessary due to the benign appearance of the mass.  Reviewed most recent MRI from July 2024.  Can return to normal yearly screening mammograms.    4. Postmenopausal bleeding.  On HRT.  She plans on seeing Dr. Ramírez to establish care with her now that Dr. Chung has retired.  If she must wait more than 6 weeks to see Dr. Ramírez she will inform me, and I will order an ultrasound.  I did review a few FSH levels of hers over the past 10 years, most recent was menopausal in 2020.  She states that she never went 12 months without having a menstrual period prior to going on hormones.  Discussed risk factors/potential causes of postmenopausal bleeding.  She does plan on following up with gynecology as above.               Please note that this dictation was created using voice recognition software. I have made every reasonable attempt to correct obvious errors, but I expect that there are errors of grammar and possibly content that I did not discover before finalizing the note.

## 2024-08-27 ENCOUNTER — APPOINTMENT (OUTPATIENT)
Dept: NEUROLOGY | Facility: MEDICAL CENTER | Age: 54
End: 2024-08-27
Attending: STUDENT IN AN ORGANIZED HEALTH CARE EDUCATION/TRAINING PROGRAM
Payer: COMMERCIAL

## 2024-09-03 ENCOUNTER — APPOINTMENT (OUTPATIENT)
Dept: RADIOLOGY | Facility: MEDICAL CENTER | Age: 54
End: 2024-09-03
Attending: NURSE PRACTITIONER
Payer: COMMERCIAL

## 2024-09-03 DIAGNOSIS — Z12.31 VISIT FOR SCREENING MAMMOGRAM: ICD-10-CM

## 2024-09-03 PROCEDURE — 77067 SCR MAMMO BI INCL CAD: CPT

## 2024-09-04 ENCOUNTER — HOSPITAL ENCOUNTER (OUTPATIENT)
Dept: RADIOLOGY | Facility: MEDICAL CENTER | Age: 54
End: 2024-09-04
Payer: COMMERCIAL

## 2024-09-23 ENCOUNTER — APPOINTMENT (OUTPATIENT)
Dept: MEDICAL GROUP | Facility: LAB | Age: 54
End: 2024-09-23
Payer: COMMERCIAL

## 2024-09-23 ENCOUNTER — OFFICE VISIT (OUTPATIENT)
Dept: MEDICAL GROUP | Facility: LAB | Age: 54
End: 2024-09-23
Payer: COMMERCIAL

## 2024-09-23 VITALS
HEART RATE: 50 BPM | TEMPERATURE: 96.2 F | OXYGEN SATURATION: 99 % | BODY MASS INDEX: 22.99 KG/M2 | RESPIRATION RATE: 12 BRPM | SYSTOLIC BLOOD PRESSURE: 120 MMHG | DIASTOLIC BLOOD PRESSURE: 68 MMHG | WEIGHT: 138 LBS | HEIGHT: 65 IN

## 2024-09-23 DIAGNOSIS — L02.224 BOIL OF GROIN: ICD-10-CM

## 2024-09-23 DIAGNOSIS — N93.9 UTERINE BLEEDING: ICD-10-CM

## 2024-09-23 DIAGNOSIS — D18.01 CHERRY ANGIOMA: ICD-10-CM

## 2024-09-23 PROCEDURE — 3078F DIAST BP <80 MM HG: CPT | Performed by: NURSE PRACTITIONER

## 2024-09-23 PROCEDURE — 99214 OFFICE O/P EST MOD 30 MIN: CPT | Performed by: NURSE PRACTITIONER

## 2024-09-23 PROCEDURE — 3074F SYST BP LT 130 MM HG: CPT | Performed by: NURSE PRACTITIONER

## 2024-09-23 ASSESSMENT — FIBROSIS 4 INDEX: FIB4 SCORE: 1.425

## 2024-09-23 NOTE — PROGRESS NOTES
Verbal consent was acquired by the patient to use [employer] ambient listening note generation during this visit Yes      Trevon Ziegler is a 53 y.o. female who presents for a few issues.   History of Present Illness  The patient presents for evaluation of multiple medical concerns.    She has observed the presence of red raised moles on her torso, breast, and legs, which have become more noticeable over the past few months to years. She reports no instances of nosebleeds or blood in urine or stool. Occasionally, she experiences gum bleeding during tooth brushing. Her daily medication includes ibuprofen 200 mg and naproxen 220 mg - once daily for chronic facial pain.     She recently developed another boil, which was examined by her gynecologist - right groin. The treatment included warm compresses and Hibiclens. This is the third boil she has experienced since starting hormone replacement therapy (HRT).  She describes the boils as painful, red, and filled with pus, similar to a water blister. She has had three such boils in the past year and a half. Despite not being very active recently due to foot trouble, she usually engages in activities like swimming, walking, hiking, and playing pickleball.    She has been experiencing uterine spotting, which Dr. Ramírez attributed to cervical polyps. An ultrasound is scheduled for tomorrow. The spotting has been intermittent, lasting for about 20 days at a time over the past few months. Currently, she has been free of spotting for 15 days. She uses an estrogen patch and takes progesterone nightly. Her last menstrual period occurred approximately 1.5 to 2 years ago, prior to starting HRT. At that time, she experienced heavy bleeding, which led to the discovery of the polyps and subsequent hormone therapy.    Chronic facial pain: This has been ongoing for a few years.  She is considering additional Botox treatments for her jaw. She has consulted with three  "ENT specialists, a periodontist, neurology, dentists regarding this matter.  She is seeing a new neurologist in October.    SOCIAL HISTORY  Limits etoh, , two kids    FAMILY HISTORY  Her sister  from liver disease    Review of Systems  Negative except for HPI  Objective   /68 (BP Location: Right arm, Patient Position: Sitting, BP Cuff Size: Adult)   Pulse (!) 50   Temp (!) 35.7 °C (96.2 °F)   Resp 12   Ht 1.651 m (5' 5\")   Wt 62.6 kg (138 lb)   SpO2 99%   Physical Exam  Gen. appears healthy in no distress   Skin appropriate for sex and age.  She has about 10-12 x  1 to 2 mm cherry angiomas to her trunk, 2 to her right breast and a few scattered to her thighs, maximum of about 5.  Neck trachea is midline  Lungs unlabored breathing  Heart regular rate  Neuro gait and station normal   Psych appropriate, calm, interactive, well-groomed    Results  Reviewed last lab work from 2023 which shows a normal CBC.       Assessment & Plan  1.  Cherry angiomas:  The red moles observed are identified as angiomas which are common and typically genetic/occur with time. They are not related to low platelet counts or liver disease. A complete blood count (CBC) will be conducted to check red blood cells for anemia and confirm platelet count. She is advised that there is no preventive measure for hemangiomas, and they are generally harmless.    2. Boils.  She is advised to continue using Hibiclens and warm compresses. She should also use any antibiotic cream she has at home at the earliest sign of a boil. Benzoyl peroxide can also be used to help treat the boils. Swimming is recommended as it is less likely to exacerbate the condition compared to other physical activities that involve sweating in folds.  Encouraged a low sugar diet.    3.  Chronic facial pain:  She is advised to consult with her neurologist about potential Botox treatment for her TMJ or consider receiving this from her periodontist.  She is " looking forward to seeing a new neurologist in October.    4. Cervical Polyp.  She has an ultrasound scheduled for tomorrow to assess the cervical polyps. She is advised to consider a hysteroscopy to address the issue of spotting, which has been occurring intermittently.                     Please note that this dictation was created using voice recognition software. I have made every reasonable attempt to correct obvious errors, but I expect that there are errors of grammar and possibly content that I did not discover before finalizing the note.

## 2024-10-02 ENCOUNTER — HOSPITAL ENCOUNTER (OUTPATIENT)
Dept: LAB | Facility: MEDICAL CENTER | Age: 54
End: 2024-10-02
Attending: NURSE PRACTITIONER
Payer: COMMERCIAL

## 2024-10-02 ENCOUNTER — APPOINTMENT (OUTPATIENT)
Dept: NEUROLOGY | Facility: MEDICAL CENTER | Age: 54
End: 2024-10-02
Attending: PSYCHIATRY & NEUROLOGY
Payer: COMMERCIAL

## 2024-10-02 VITALS
WEIGHT: 139.99 LBS | SYSTOLIC BLOOD PRESSURE: 118 MMHG | HEIGHT: 66 IN | HEART RATE: 54 BPM | TEMPERATURE: 96.7 F | BODY MASS INDEX: 22.5 KG/M2 | DIASTOLIC BLOOD PRESSURE: 64 MMHG | OXYGEN SATURATION: 100 %

## 2024-10-02 DIAGNOSIS — Z00.00 PREVENTATIVE HEALTH CARE: ICD-10-CM

## 2024-10-02 DIAGNOSIS — G50.0 FACIAL PAIN SYNDROME: ICD-10-CM

## 2024-10-02 LAB
25(OH)D3 SERPL-MCNC: 58 NG/ML (ref 30–100)
ALBUMIN SERPL BCP-MCNC: 4.4 G/DL (ref 3.2–4.9)
ALBUMIN/GLOB SERPL: 1.3 G/DL
ALP SERPL-CCNC: 52 U/L (ref 30–99)
ALT SERPL-CCNC: 16 U/L (ref 2–50)
ANION GAP SERPL CALC-SCNC: 12 MMOL/L (ref 7–16)
AST SERPL-CCNC: 22 U/L (ref 12–45)
BASOPHILS # BLD AUTO: 0.7 % (ref 0–1.8)
BASOPHILS # BLD: 0.04 K/UL (ref 0–0.12)
BILIRUB SERPL-MCNC: 0.5 MG/DL (ref 0.1–1.5)
BUN SERPL-MCNC: 18 MG/DL (ref 8–22)
CALCIUM ALBUM COR SERPL-MCNC: 10.1 MG/DL (ref 8.5–10.5)
CALCIUM SERPL-MCNC: 10.4 MG/DL (ref 8.5–10.5)
CHLORIDE SERPL-SCNC: 104 MMOL/L (ref 96–112)
CHOLEST SERPL-MCNC: 271 MG/DL (ref 100–199)
CO2 SERPL-SCNC: 24 MMOL/L (ref 20–33)
CREAT SERPL-MCNC: 0.83 MG/DL (ref 0.5–1.4)
EOSINOPHIL # BLD AUTO: 0.11 K/UL (ref 0–0.51)
EOSINOPHIL NFR BLD: 2 % (ref 0–6.9)
ERYTHROCYTE [DISTWIDTH] IN BLOOD BY AUTOMATED COUNT: 43.6 FL (ref 35.9–50)
GFR SERPLBLD CREATININE-BSD FMLA CKD-EPI: 83 ML/MIN/1.73 M 2
GLOBULIN SER CALC-MCNC: 3.5 G/DL (ref 1.9–3.5)
GLUCOSE SERPL-MCNC: 71 MG/DL (ref 65–99)
HCT VFR BLD AUTO: 47.8 % (ref 37–47)
HDLC SERPL-MCNC: 92 MG/DL
HGB BLD-MCNC: 16 G/DL (ref 12–16)
IMM GRANULOCYTES # BLD AUTO: 0.01 K/UL (ref 0–0.11)
IMM GRANULOCYTES NFR BLD AUTO: 0.2 % (ref 0–0.9)
LDLC SERPL CALC-MCNC: 168 MG/DL
LYMPHOCYTES # BLD AUTO: 1.77 K/UL (ref 1–4.8)
LYMPHOCYTES NFR BLD: 31.7 % (ref 22–41)
MCH RBC QN AUTO: 30.5 PG (ref 27–33)
MCHC RBC AUTO-ENTMCNC: 33.5 G/DL (ref 32.2–35.5)
MCV RBC AUTO: 91.2 FL (ref 81.4–97.8)
MONOCYTES # BLD AUTO: 0.56 K/UL (ref 0–0.85)
MONOCYTES NFR BLD AUTO: 10 % (ref 0–13.4)
NEUTROPHILS # BLD AUTO: 3.09 K/UL (ref 1.82–7.42)
NEUTROPHILS NFR BLD: 55.4 % (ref 44–72)
NRBC # BLD AUTO: 0 K/UL
NRBC BLD-RTO: 0 /100 WBC (ref 0–0.2)
PLATELET # BLD AUTO: 207 K/UL (ref 164–446)
PMV BLD AUTO: 11.3 FL (ref 9–12.9)
POTASSIUM SERPL-SCNC: 4.5 MMOL/L (ref 3.6–5.5)
PROT SERPL-MCNC: 7.9 G/DL (ref 6–8.2)
RBC # BLD AUTO: 5.24 M/UL (ref 4.2–5.4)
SODIUM SERPL-SCNC: 140 MMOL/L (ref 135–145)
T4 FREE SERPL-MCNC: 1.4 NG/DL (ref 0.93–1.7)
TRIGL SERPL-MCNC: 55 MG/DL (ref 0–149)
TSH SERPL-ACNC: 2 UIU/ML (ref 0.35–5.5)
WBC # BLD AUTO: 5.6 K/UL (ref 4.8–10.8)

## 2024-10-02 PROCEDURE — 82306 VITAMIN D 25 HYDROXY: CPT

## 2024-10-02 PROCEDURE — 3074F SYST BP LT 130 MM HG: CPT | Performed by: PSYCHIATRY & NEUROLOGY

## 2024-10-02 PROCEDURE — 3078F DIAST BP <80 MM HG: CPT | Performed by: PSYCHIATRY & NEUROLOGY

## 2024-10-02 PROCEDURE — 84443 ASSAY THYROID STIM HORMONE: CPT

## 2024-10-02 PROCEDURE — 36415 COLL VENOUS BLD VENIPUNCTURE: CPT

## 2024-10-02 PROCEDURE — 84439 ASSAY OF FREE THYROXINE: CPT

## 2024-10-02 PROCEDURE — 99212 OFFICE O/P EST SF 10 MIN: CPT | Performed by: PSYCHIATRY & NEUROLOGY

## 2024-10-02 PROCEDURE — 85025 COMPLETE CBC W/AUTO DIFF WBC: CPT

## 2024-10-02 PROCEDURE — 80053 COMPREHEN METABOLIC PANEL: CPT

## 2024-10-02 PROCEDURE — 99205 OFFICE O/P NEW HI 60 MIN: CPT | Performed by: PSYCHIATRY & NEUROLOGY

## 2024-10-02 PROCEDURE — 80061 LIPID PANEL: CPT

## 2024-10-02 ASSESSMENT — FIBROSIS 4 INDEX: FIB4 SCORE: 1.425

## 2024-10-02 ASSESSMENT — PATIENT HEALTH QUESTIONNAIRE - PHQ9: CLINICAL INTERPRETATION OF PHQ2 SCORE: 0

## 2024-10-07 DIAGNOSIS — E78.49 OTHER HYPERLIPIDEMIA: ICD-10-CM

## 2024-10-10 ENCOUNTER — HOSPITAL ENCOUNTER (OUTPATIENT)
Dept: RADIOLOGY | Facility: MEDICAL CENTER | Age: 54
End: 2024-10-10
Attending: NURSE PRACTITIONER
Payer: COMMERCIAL

## 2024-10-10 DIAGNOSIS — E78.49 OTHER HYPERLIPIDEMIA: ICD-10-CM

## 2024-10-10 PROCEDURE — 4410556 CT-CARDIAC SCORING (SELF PAY ONLY)

## 2024-11-04 ENCOUNTER — APPOINTMENT (OUTPATIENT)
Dept: MEDICAL GROUP | Facility: LAB | Age: 54
End: 2024-11-04
Payer: COMMERCIAL

## 2024-11-06 ENCOUNTER — OFFICE VISIT (OUTPATIENT)
Dept: MEDICAL GROUP | Facility: LAB | Age: 54
End: 2024-11-06
Payer: COMMERCIAL

## 2024-11-06 VITALS
WEIGHT: 128 LBS | HEART RATE: 58 BPM | TEMPERATURE: 96.3 F | OXYGEN SATURATION: 96 % | RESPIRATION RATE: 12 BRPM | BODY MASS INDEX: 20.57 KG/M2 | DIASTOLIC BLOOD PRESSURE: 76 MMHG | SYSTOLIC BLOOD PRESSURE: 118 MMHG | HEIGHT: 66 IN

## 2024-11-06 DIAGNOSIS — E78.5 HYPERLIPIDEMIA, UNSPECIFIED HYPERLIPIDEMIA TYPE: ICD-10-CM

## 2024-11-06 DIAGNOSIS — N84.0 ENDOMETRIAL POLYP: ICD-10-CM

## 2024-11-06 DIAGNOSIS — L23.9 ALLERGIC DERMATITIS: ICD-10-CM

## 2024-11-06 PROCEDURE — 99214 OFFICE O/P EST MOD 30 MIN: CPT | Performed by: NURSE PRACTITIONER

## 2024-11-06 PROCEDURE — 3078F DIAST BP <80 MM HG: CPT | Performed by: NURSE PRACTITIONER

## 2024-11-06 PROCEDURE — 3074F SYST BP LT 130 MM HG: CPT | Performed by: NURSE PRACTITIONER

## 2024-11-06 RX ORDER — HYDROXYZINE HYDROCHLORIDE 25 MG/1
12.5-25 TABLET, FILM COATED ORAL 3 TIMES DAILY PRN
Qty: 30 TABLET | Refills: 0 | Status: SHIPPED | OUTPATIENT
Start: 2024-11-06

## 2024-11-06 ASSESSMENT — FIBROSIS 4 INDEX: FIB4 SCORE: 1.43

## 2024-11-06 NOTE — PROGRESS NOTES
"Chief Complaint   Patient presents with    Rash     X 10 days     History of Present Illness  Kary is a 53-year-old established female here with concern regarding 10 d of hives / itching to arms / legs / trunk.  Hx of same 28 yrs ago and given shot that helped.  Under a lot of stress.  Taking allegra for this.    Having gyn surgery tomomorrow - endometrial polyps removed.    Known allergies to environmental things.    No other associated symptoms such as difficulty breathing or throat tightening.   She does feel that her hives are improving.    Hyperlipidemia: Chronic issue.  2 negative CT cardiac scores in the past decade.  Dad has coronary artery disease but was a smoker and his use.  Not having chest pain.  Very physically active.  Not a smoker.  Wants to know if she should take a statin.      Exam:   /76 (BP Location: Right arm, Patient Position: Sitting, BP Cuff Size: Adult)   Pulse (!) 58   Temp (!) 35.7 °C (96.3 °F)   Resp 12   Ht 1.676 m (5' 6\")   Wt 58.1 kg (128 lb)   SpO2 96%   BMI 20.66 kg/m²   Gen. appears healthy in no distress   Skin appropriate for sex and age.  She has a fine, slightly erythematous papular rash to legs, arms and trunk.  No rash to her face.  Neck trachea is midline  Lungs unlabored breathing  Heart regular rate  Neuro gait and station normal   Psych appropriate, calm, interactive, well-groomed        Assessment / Plan:  Assessment & Plan  #1-allergic dermatitis: Uncertain etiology.  Certainly could be stress.  Slowly improving.  Refrain from corticosteroid injection today because of her surgery tomorrow.  Can continue Allegra.  Could take hydroxyzine in the evenings so that she may sleep a bit better and itch less.  Discussed side effects of antihistamine such as drowsiness, dry mouth and constipation.  If rash persist next week, could certainly follow-up for corticosteroid injection if desired.  Also if rash returns, should consider seeing an allergist for testing.  2. "  Hyperlipidemia: Recommend APO B and lipoprotein a.  Reviewed both negative CT cardiac scores.  No indication for statin at this time.  3.  Endometrial polyp: Scheduled for GYN surgery tomorrow with Dr. Ramírez at 10 AM.

## 2024-12-05 ENCOUNTER — HOSPITAL ENCOUNTER (OUTPATIENT)
Dept: RADIOLOGY | Facility: MEDICAL CENTER | Age: 54
End: 2024-12-05
Attending: PSYCHIATRY & NEUROLOGY
Payer: COMMERCIAL

## 2024-12-05 DIAGNOSIS — G50.0 FACIAL PAIN SYNDROME: ICD-10-CM

## 2024-12-05 PROCEDURE — 700117 HCHG RX CONTRAST REV CODE 255: Mod: JZ | Performed by: PSYCHIATRY & NEUROLOGY

## 2024-12-05 PROCEDURE — 70553 MRI BRAIN STEM W/O & W/DYE: CPT

## 2024-12-05 PROCEDURE — A9579 GAD-BASE MR CONTRAST NOS,1ML: HCPCS | Mod: JZ | Performed by: PSYCHIATRY & NEUROLOGY

## 2024-12-05 RX ADMIN — GADOTERIDOL 13 ML: 279.3 INJECTION, SOLUTION INTRAVENOUS at 10:14

## 2024-12-12 ENCOUNTER — OFFICE VISIT (OUTPATIENT)
Dept: NEUROLOGY | Facility: MEDICAL CENTER | Age: 54
End: 2024-12-12
Attending: PSYCHIATRY & NEUROLOGY
Payer: COMMERCIAL

## 2024-12-12 VITALS
BODY MASS INDEX: 23.03 KG/M2 | HEIGHT: 66 IN | WEIGHT: 143.3 LBS | HEART RATE: 55 BPM | DIASTOLIC BLOOD PRESSURE: 74 MMHG | SYSTOLIC BLOOD PRESSURE: 100 MMHG | OXYGEN SATURATION: 100 % | RESPIRATION RATE: 12 BRPM | TEMPERATURE: 97.4 F

## 2024-12-12 DIAGNOSIS — G50.0 FACIAL PAIN SYNDROME: ICD-10-CM

## 2024-12-12 PROCEDURE — 3078F DIAST BP <80 MM HG: CPT | Performed by: PSYCHIATRY & NEUROLOGY

## 2024-12-12 PROCEDURE — 99212 OFFICE O/P EST SF 10 MIN: CPT | Performed by: PSYCHIATRY & NEUROLOGY

## 2024-12-12 PROCEDURE — 3074F SYST BP LT 130 MM HG: CPT | Performed by: PSYCHIATRY & NEUROLOGY

## 2024-12-12 RX ORDER — ALPRAZOLAM 0.25 MG/1
TABLET ORAL
COMMUNITY

## 2024-12-12 ASSESSMENT — FIBROSIS 4 INDEX: FIB4 SCORE: 1.46

## 2024-12-12 ASSESSMENT — PATIENT HEALTH QUESTIONNAIRE - PHQ9: CLINICAL INTERPRETATION OF PHQ2 SCORE: 0

## 2024-12-12 NOTE — PROGRESS NOTES
[facility] NEUROLOGY [facility]    Date of service: 12/12/24    Last office encounter: 10/2/24    Chief complain  Facial pain.       Follow up history  Kary returns to the clinic for follow up of a facial pain syndrome. Her symptoms are much better and she has been able to stop the Advil. She has mild persistent feeling of swelling of the left jaw, but is not too bothered by it. She completed an MRI of the brain with a focus on cranial nerves, which came back negative. She has been in good health otherwise.     Review of Systems (ROS)  Negative for: Fever, chills, chest pain, shortness of breath, cough, diarrhea, constipation, urinary frequency, dysuria, skin rash, swelling.      Medications  Outpatient Medications Marked as Taking for the 12/12/24 encounter (Office Visit) with Victor Manuel Arias M.D.   Medication Sig Dispense Refill    ALPRAZolam (XANAX) 0.25 MG Tab       vitamin D2, Ergocalciferol, (DRISDOL) 1.25 MG (46613 UT) Cap capsule TAKE 1 CAPSULE BY MOUTH ONE TIME PER WEEK 12 Capsule 3    estradiol (CLIMARA) 0.05 MG/24HR PATCH WEEKLY       FIBER SELECT GUMMIES PO 2 tablets every day by oral route.      progesterone (PROMETRIUM) 100 MG Cap 100 mg.      ascorbic acid (VITAMIN C) 500 MG tablet 1 tablet every day by oral route.      CALCIUM PO       estradiol (ESTRACE) 0.1 MG/GM vaginal cream INSERT 1 GM NIGHTLY FOR 7 NIGHTS AND THEN ONLY 3 NIGHTS PER WEEK.             Physical exam    Vitals:    12/12/24 0848   BP: 100/74   Pulse: (!) 55   Resp: 12   Temp: 36.3 °C (97.4 °F)   SpO2: 100%           Focused Neurological Exam    Alert and oriented. Normal speech fluency and comprehension.  No cranial neuropathies appreciated. No facial swelling was seen.   Moves all extremities and walks without support.     Depression Screening    Little interest or pleasure in doing things?  0 - not at all  Feeling down, depressed , or hopeless? 0 - not at all  Trouble falling or staying asleep, or sleeping too much?     Feeling tired  or having little energy?     Poor appetite or overeating?     Feeling bad about yourself - or that you are a failure or have let yourself or your family down?    Trouble concentrating on things, such as reading the newspaper or watching television?    Moving or speaking so slowly that other people could have noticed.  Or the opposite - being so fidgety or restless that you have been moving around a lot more than usual?     Thoughts that you would be better off dead, or of hurting yourself?     Patient Health Questionnaire Score:        If depressive symptoms identified deferred to follow up visit unless specifically addressed in assesment and plan.    Interpretation of PHQ-9 Total Score   Score Severity   1-4 No Depression   5-9 Mild Depression   10-14 Moderate Depression   15-19 Moderately Severe Depression   20-27 Severe Depression      Laboratory results  Lab Results   Component Value Date/Time    WBC 5.6 10/02/2024 09:01 AM    RBC 5.24 10/02/2024 09:01 AM    HEMOGLOBIN 16.0 10/02/2024 09:01 AM    HEMATOCRIT 47.8 (H) 10/02/2024 09:01 AM    MCV 91.2 10/02/2024 09:01 AM    MCH 30.5 10/02/2024 09:01 AM    MCHC 33.5 10/02/2024 09:01 AM    MPV 11.3 10/02/2024 09:01 AM    NEUTSPOLYS 55.40 10/02/2024 09:01 AM    LYMPHOCYTES 31.70 10/02/2024 09:01 AM    MONOCYTES 10.00 10/02/2024 09:01 AM    EOSINOPHILS 2.00 10/02/2024 09:01 AM    BASOPHILS 0.70 10/02/2024 09:01 AM    HYPOCHROMIA 1+ 11/04/2013 09:25 AM        Lab Results   Component Value Date/Time    SODIUM 140 10/02/2024 09:01 AM    POTASSIUM 4.5 10/02/2024 09:01 AM    CHLORIDE 104 10/02/2024 09:01 AM    CO2 24 10/02/2024 09:01 AM    GLUCOSE 71 10/02/2024 09:01 AM    BUN 18 10/02/2024 09:01 AM    CREATININE 0.83 10/02/2024 09:01 AM          Neuro-Imaging  MRI brain: 12/5/24    No contrast enhancing lesion around the trigeminal nerve on the left side. No other cranial nerve abnormalities.        Reading Physician Reading Date Result Priority   Alton Sandoval,  M.D.  [phone] 12/5/2024      Narrative & Impression     12/5/2024 8:46 AM     HISTORY/REASON FOR EXAM: Left Sided Facial Pain;     TECHNIQUE/EXAM DESCRIPTION:   MRI of the brain without and with contrast (trigeminal nerve protocol).     T1 sagittal, T2 fast spin-echo axial, T1 coronal, FLAIR coronal, diffusion-weighted and apparent diffusion coefficient (ADC map) axial images were obtained of the whole brain. Additional thin-section T1 axial images and T1 postcontrast fat-suppressed   axial images were obtained from the level of the thalami through the maxillary sinuses to display the skull base and cranial nerves including the course of the trigeminal nerves.     The study was performed on a Jarrell 1.5 Raine MRI scanner.     13 mL ProHance contrast was administered intravenously.     COMPARISON:  None.     FINDINGS:  The calvaria are unremarkable. There are no extra-axial fluid collections. The ventricular system and basal cisterns are within normal limits. There are no areas of abnormal signal in the brain substance. There are no mass effects or shift of midline   structures. There are no hemorrhagic lesions. The diffusion-weighted axial images show no evidence of acute cerebral infarction.     The brainstem and posterior fossa structures are unremarkable. There is no abnormal parenchymal or meningeal enhancement.     Vascular flow voids in the carotid and vertebrobasilar arteries, [place], and dural venous sinuses are intact.     There is no abnormal mass or enhancement evident along the cisternal pathways of the cranial nerves, and the cavernous sinuses show normal configuration. No parasellar or skull base lesions are evident. The pterygopalatine fossae are unremarkable. The   muscles of mastication are symmetric.     The paranasal sinuses and mastoids in the field of view are unremarkable.     IMPRESSION:     MRI OF THE BRAIN WITHOUT AND WITH CONTRAST, WITH ATTENTION TO THE CRANIAL NERVES AND  SKULL BASE, WITHIN NORMAL LIMITS.      Neuro-Diagnostics  N/a      Other pertinent non-neurological workup  N/a      Impression and Plan  Briefly, 54 y.o. 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 Her symptoms have improved significantly without any treatment. The MRI of the brain and skull base was unremarkable. I demonstrated these findings to her. She is not interested in any treatments at the moment.     Numerous questions were answered to the best of my knowledge. The patient is in agreement with the plan.   Return to the clinic as needed.      ADMINISTRATIVE BILLING  I spent a total of 16 minutes on this patient encounter.      Victor Manuel Arias MD  Diplomate of the American Board of Psychiatry and Neurology.  General Neurology & Neuro-Oncology.  [facility].   of Clinical Neurology at [facility].

## 2025-02-13 ENCOUNTER — APPOINTMENT (OUTPATIENT)
Dept: RADIOLOGY | Facility: MEDICAL CENTER | Age: 55
End: 2025-02-13
Attending: OTOLARYNGOLOGY
Payer: COMMERCIAL

## 2025-03-11 ENCOUNTER — APPOINTMENT (OUTPATIENT)
Dept: URBAN - METROPOLITAN AREA CLINIC 4 | Facility: CLINIC | Age: 55
Setting detail: DERMATOLOGY
End: 2025-03-11

## 2025-03-11 DIAGNOSIS — Z71.89 OTHER SPECIFIED COUNSELING: ICD-10-CM

## 2025-03-11 DIAGNOSIS — D22 MELANOCYTIC NEVI: ICD-10-CM

## 2025-03-11 DIAGNOSIS — L81.4 OTHER MELANIN HYPERPIGMENTATION: ICD-10-CM

## 2025-03-11 DIAGNOSIS — D18.0 HEMANGIOMA: ICD-10-CM

## 2025-03-11 PROBLEM — D22.39 MELANOCYTIC NEVI OF OTHER PARTS OF FACE: Status: ACTIVE | Noted: 2025-03-11

## 2025-03-11 PROBLEM — D22.5 MELANOCYTIC NEVI OF TRUNK: Status: ACTIVE | Noted: 2025-03-11

## 2025-03-11 PROBLEM — D18.01 HEMANGIOMA OF SKIN AND SUBCUTANEOUS TISSUE: Status: ACTIVE | Noted: 2025-03-11

## 2025-03-11 PROBLEM — D23.61 OTHER BENIGN NEOPLASM OF SKIN OF RIGHT UPPER LIMB, INCLUDING SHOULDER: Status: ACTIVE | Noted: 2025-03-11

## 2025-03-11 PROBLEM — D22.4 MELANOCYTIC NEVI OF SCALP AND NECK: Status: ACTIVE | Noted: 2025-03-11

## 2025-03-11 PROBLEM — D22.72 MELANOCYTIC NEVI OF LEFT LOWER LIMB, INCLUDING HIP: Status: ACTIVE | Noted: 2025-03-11

## 2025-03-11 PROCEDURE — 99213 OFFICE O/P EST LOW 20 MIN: CPT

## 2025-03-11 PROCEDURE — ? COUNSELING

## 2025-03-11 ASSESSMENT — LOCATION SIMPLE DESCRIPTION DERM
LOCATION SIMPLE: RIGHT UPPER BACK
LOCATION SIMPLE: CHEST
LOCATION SIMPLE: ABDOMEN
LOCATION SIMPLE: LEFT ZYGOMA
LOCATION SIMPLE: LEFT BREAST
LOCATION SIMPLE: LEFT SHOULDER
LOCATION SIMPLE: LEFT UPPER BACK
LOCATION SIMPLE: RIGHT SHOULDER
LOCATION SIMPLE: RIGHT CHEEK
LOCATION SIMPLE: SCALP
LOCATION SIMPLE: LEFT CHEEK
LOCATION SIMPLE: LEFT THIGH
LOCATION SIMPLE: LEFT LOWER BACK

## 2025-03-11 ASSESSMENT — LOCATION ZONE DERM
LOCATION ZONE: LEG
LOCATION ZONE: ARM
LOCATION ZONE: SCALP
LOCATION ZONE: TRUNK
LOCATION ZONE: FACE

## 2025-03-11 ASSESSMENT — LOCATION DETAILED DESCRIPTION DERM
LOCATION DETAILED: LEFT LATERAL ABDOMEN
LOCATION DETAILED: RIGHT POSTERIOR SHOULDER
LOCATION DETAILED: RIGHT INFERIOR UPPER BACK
LOCATION DETAILED: RIGHT RIB CAGE
LOCATION DETAILED: RIGHT LATERAL ABDOMEN
LOCATION DETAILED: LEFT ANTERIOR PROXIMAL THIGH
LOCATION DETAILED: LEFT POSTERIOR SHOULDER
LOCATION DETAILED: LEFT LATERAL UPPER BACK
LOCATION DETAILED: LEFT SUPERIOR LATERAL MALAR CHEEK
LOCATION DETAILED: LEFT LATERAL ZYGOMA
LOCATION DETAILED: LEFT MEDIAL MALAR CHEEK
LOCATION DETAILED: RIGHT SUPERIOR LATERAL MALAR CHEEK
LOCATION DETAILED: LEFT MEDIAL BREAST 10-11:00 REGION
LOCATION DETAILED: LEFT LATERAL SUPERIOR CHEST
LOCATION DETAILED: LEFT INFERIOR LATERAL MIDBACK
LOCATION DETAILED: LEFT SUPERIOR PARIETAL SCALP
LOCATION DETAILED: EPIGASTRIC SKIN
LOCATION DETAILED: RIGHT CENTRAL PARIETAL SCALP

## 2025-03-11 NOTE — PROCEDURE: COUNSELING
Detail Level: Detailed
Patient Specific Counseling (Will Not Stick From Patient To Patient): Recommended an SPF of 50+ due to the appearance of solar lentiginies distributed across the face.
Detail Level: Zone

## 2025-03-16 ENCOUNTER — OFFICE VISIT (OUTPATIENT)
Dept: URGENT CARE | Facility: PHYSICIAN GROUP | Age: 55
End: 2025-03-16
Payer: COMMERCIAL

## 2025-03-16 VITALS
OXYGEN SATURATION: 97 % | RESPIRATION RATE: 14 BRPM | BODY MASS INDEX: 22.98 KG/M2 | TEMPERATURE: 97 F | HEIGHT: 66 IN | WEIGHT: 143 LBS | SYSTOLIC BLOOD PRESSURE: 118 MMHG | DIASTOLIC BLOOD PRESSURE: 82 MMHG | HEART RATE: 60 BPM

## 2025-03-16 DIAGNOSIS — J02.9 PHARYNGITIS, UNSPECIFIED ETIOLOGY: ICD-10-CM

## 2025-03-16 PROCEDURE — 3074F SYST BP LT 130 MM HG: CPT | Performed by: FAMILY MEDICINE

## 2025-03-16 PROCEDURE — 3079F DIAST BP 80-89 MM HG: CPT | Performed by: FAMILY MEDICINE

## 2025-03-16 PROCEDURE — 99213 OFFICE O/P EST LOW 20 MIN: CPT | Performed by: FAMILY MEDICINE

## 2025-03-16 RX ORDER — AMOXICILLIN 500 MG/1
500 CAPSULE ORAL 2 TIMES DAILY
Qty: 14 CAPSULE | Refills: 0 | Status: SHIPPED | OUTPATIENT
Start: 2025-03-16 | End: 2025-03-23

## 2025-03-16 ASSESSMENT — ENCOUNTER SYMPTOMS
FEVER: 0
COUGH: 0
SORE THROAT: 1

## 2025-03-16 ASSESSMENT — FIBROSIS 4 INDEX: FIB4 SCORE: 1.46

## 2025-03-16 NOTE — PROGRESS NOTES
Subjective:     Kary Ziegler is a 54 y.o. female who presents for Pharyngitis (Swollen glands, white spots back of throat, ear pain, discomfort in jaw /X 2 weeks )    HPI  Pt presents for evaluation of an acute problem  Pt with sore throat for 2 weeks   Has pain with swallowing   Feels pain equally bilaterally   Noticed white spots in the back of throat   Has some mild nasal congestion   No cough     Review of Systems   Constitutional:  Negative for fever.   HENT:  Positive for sore throat.    Respiratory:  Negative for cough.        PMH:  has a past medical history of Anxiety, Asthma, and Heart murmur.  MEDS:   Current Outpatient Medications:     amoxicillin (AMOXIL) 500 MG Cap, Take 1 Capsule by mouth 2 times a day for 7 days., Disp: 14 Capsule, Rfl: 0    ALPRAZolam (XANAX) 0.25 MG Tab, , Disp: , Rfl:     hydrOXYzine HCl (ATARAX) 25 MG Tab, Take 0.5-1 Tablets by mouth 3 times a day as needed for Itching. May take for anxiety or sleep as well., Disp: 30 Tablet, Rfl: 0    vitamin D2, Ergocalciferol, (DRISDOL) 1.25 MG (41336 UT) Cap capsule, TAKE 1 CAPSULE BY MOUTH ONE TIME PER WEEK, Disp: 12 Capsule, Rfl: 3    estradiol (CLIMARA) 0.05 MG/24HR PATCH WEEKLY, , Disp: , Rfl:     FIBER SELECT GUMMIES PO, 2 tablets every day by oral route., Disp: , Rfl:     progesterone (PROMETRIUM) 100 MG Cap, 100 mg., Disp: , Rfl:     ascorbic acid (VITAMIN C) 500 MG tablet, 1 tablet every day by oral route., Disp: , Rfl:     CALCIUM PO, , Disp: , Rfl:     estradiol (ESTRACE) 0.1 MG/GM vaginal cream, INSERT 1 GM NIGHTLY FOR 7 NIGHTS AND THEN ONLY 3 NIGHTS PER WEEK., Disp: , Rfl:   ALLERGIES: No Known Allergies  SURGHX:   Past Surgical History:   Procedure Laterality Date    HYSTEROSCOPY ESSURE COIL  11/07/2024    DENTAL SURGERY  1986     SOCHX:  reports that she has never smoked. She has never been exposed to tobacco smoke. She has never used smokeless tobacco. She reports current alcohol use. She reports that she does not use  "drugs.     Objective:   /82   Pulse 60   Temp 36.1 °C (97 °F) (Temporal)   Resp 14   Ht 1.676 m (5' 6\")   Wt 64.9 kg (143 lb)   SpO2 97%   BMI 23.08 kg/m²     Physical Exam  Constitutional:       General: She is not in acute distress.     Appearance: She is well-developed. She is not diaphoretic.   HENT:      Head: Normocephalic and atraumatic.      Right Ear: Tympanic membrane, ear canal and external ear normal.      Left Ear: Tympanic membrane, ear canal and external ear normal.      Nose: Nose normal.      Mouth/Throat:      Mouth: Mucous membranes are moist.      Comments: Tonsils 1+, mild erythema in the posterior pharynx, no unilateral swelling, no uvular deviation, small tonsillar stone on the right  Pulmonary:      Effort: Pulmonary effort is normal.   Musculoskeletal:      Cervical back: Normal range of motion and neck supple. No tenderness.   Lymphadenopathy:      Cervical: No cervical adenopathy.   Neurological:      Mental Status: She is alert.       Assessment/Plan:   Assessment    1. Pharyngitis, unspecified etiology  - amoxicillin (AMOXIL) 500 MG Cap; Take 1 Capsule by mouth 2 times a day for 7 days.  Dispense: 14 Capsule; Refill: 0    Patient with pharyngitis.  Has had symptoms for 2 weeks and not resolving.  On exam, erythema and swelling are mild, but did recommend treatment since she is not resolving after 2 full weeks.  Start amoxicillin and reviewed other supportive care measures.  Follow-up in the urgent care as needed.    "

## 2025-03-20 DIAGNOSIS — G47.09 OTHER INSOMNIA: ICD-10-CM

## 2025-03-20 RX ORDER — ALPRAZOLAM 0.25 MG
0.25 TABLET ORAL
Qty: 30 TABLET | Refills: 0 | Status: SHIPPED | OUTPATIENT
Start: 2025-03-20 | End: 2025-04-19

## 2025-03-20 NOTE — TELEPHONE ENCOUNTER
Received request via: Pharmacy    Was the patient seen in the last year in this department? Yes    Does the patient have an active prescription (recently filled or refills available) for medication(s) requested? No    Ph[facility] Name[facility]    Does the patient have [facility] and need 100-day supply? (This applies to ALL medications) Patient does not have SCP

## 2025-04-03 ENCOUNTER — APPOINTMENT (OUTPATIENT)
Dept: MEDICAL GROUP | Facility: LAB | Age: 55
End: 2025-04-03
Payer: COMMERCIAL

## 2025-04-03 ENCOUNTER — HOSPITAL ENCOUNTER (OUTPATIENT)
Dept: LAB | Facility: MEDICAL CENTER | Age: 55
End: 2025-04-03
Attending: NURSE PRACTITIONER
Payer: COMMERCIAL

## 2025-04-03 VITALS
BODY MASS INDEX: 22.6 KG/M2 | SYSTOLIC BLOOD PRESSURE: 120 MMHG | DIASTOLIC BLOOD PRESSURE: 70 MMHG | TEMPERATURE: 97 F | WEIGHT: 140 LBS | HEART RATE: 54 BPM | OXYGEN SATURATION: 99 % | RESPIRATION RATE: 12 BRPM

## 2025-04-03 DIAGNOSIS — Z78.0 MENOPAUSE: ICD-10-CM

## 2025-04-03 DIAGNOSIS — E78.49 OTHER HYPERLIPIDEMIA: ICD-10-CM

## 2025-04-03 DIAGNOSIS — Z79.890 HORMONE REPLACEMENT THERAPY (HRT): ICD-10-CM

## 2025-04-03 DIAGNOSIS — R76.8 ANTI-TPO ANTIBODIES PRESENT: ICD-10-CM

## 2025-04-03 DIAGNOSIS — E78.5 HYPERLIPIDEMIA, UNSPECIFIED HYPERLIPIDEMIA TYPE: ICD-10-CM

## 2025-04-03 DIAGNOSIS — R51.9 FACIAL PAIN: ICD-10-CM

## 2025-04-03 LAB
BASOPHILS # BLD AUTO: 1 % (ref 0–1.8)
BASOPHILS # BLD: 0.04 K/UL (ref 0–0.12)
EOSINOPHIL # BLD AUTO: 0.12 K/UL (ref 0–0.51)
EOSINOPHIL NFR BLD: 2.9 % (ref 0–6.9)
ERYTHROCYTE [DISTWIDTH] IN BLOOD BY AUTOMATED COUNT: 43 FL (ref 35.9–50)
HCT VFR BLD AUTO: 46 % (ref 37–47)
HGB BLD-MCNC: 15.4 G/DL (ref 12–16)
IMM GRANULOCYTES # BLD AUTO: 0.01 K/UL (ref 0–0.11)
IMM GRANULOCYTES NFR BLD AUTO: 0.2 % (ref 0–0.9)
LYMPHOCYTES # BLD AUTO: 1.4 K/UL (ref 1–4.8)
LYMPHOCYTES NFR BLD: 33.8 % (ref 22–41)
MCH RBC QN AUTO: 30 PG (ref 27–33)
MCHC RBC AUTO-ENTMCNC: 33.5 G/DL (ref 32.2–35.5)
MCV RBC AUTO: 89.7 FL (ref 81.4–97.8)
MONOCYTES # BLD AUTO: 0.36 K/UL (ref 0–0.85)
MONOCYTES NFR BLD AUTO: 8.7 % (ref 0–13.4)
NEUTROPHILS # BLD AUTO: 2.21 K/UL (ref 1.82–7.42)
NEUTROPHILS NFR BLD: 53.4 % (ref 44–72)
NRBC # BLD AUTO: 0 K/UL
NRBC BLD-RTO: 0 /100 WBC (ref 0–0.2)
PLATELET # BLD AUTO: 194 K/UL (ref 164–446)
PMV BLD AUTO: 11.4 FL (ref 9–12.9)
RBC # BLD AUTO: 5.13 M/UL (ref 4.2–5.4)
WBC # BLD AUTO: 4.1 K/UL (ref 4.8–10.8)

## 2025-04-03 PROCEDURE — 36415 COLL VENOUS BLD VENIPUNCTURE: CPT

## 2025-04-03 PROCEDURE — 82670 ASSAY OF TOTAL ESTRADIOL: CPT

## 2025-04-03 PROCEDURE — 84144 ASSAY OF PROGESTERONE: CPT

## 2025-04-03 PROCEDURE — 80053 COMPREHEN METABOLIC PANEL: CPT

## 2025-04-03 PROCEDURE — 3074F SYST BP LT 130 MM HG: CPT | Performed by: NURSE PRACTITIONER

## 2025-04-03 PROCEDURE — 99214 OFFICE O/P EST MOD 30 MIN: CPT | Performed by: NURSE PRACTITIONER

## 2025-04-03 PROCEDURE — 83695 ASSAY OF LIPOPROTEIN(A): CPT

## 2025-04-03 PROCEDURE — 3078F DIAST BP <80 MM HG: CPT | Performed by: NURSE PRACTITIONER

## 2025-04-03 PROCEDURE — 84270 ASSAY OF SEX HORMONE GLOBUL: CPT

## 2025-04-03 PROCEDURE — 84443 ASSAY THYROID STIM HORMONE: CPT

## 2025-04-03 PROCEDURE — 84402 ASSAY OF FREE TESTOSTERONE: CPT

## 2025-04-03 PROCEDURE — 82172 ASSAY OF APOLIPOPROTEIN: CPT

## 2025-04-03 PROCEDURE — 84439 ASSAY OF FREE THYROXINE: CPT

## 2025-04-03 PROCEDURE — 85025 COMPLETE CBC W/AUTO DIFF WBC: CPT

## 2025-04-03 PROCEDURE — 84403 ASSAY OF TOTAL TESTOSTERONE: CPT

## 2025-04-03 ASSESSMENT — FIBROSIS 4 INDEX: FIB4 SCORE: 1.46

## 2025-04-03 ASSESSMENT — PATIENT HEALTH QUESTIONNAIRE - PHQ9: CLINICAL INTERPRETATION OF PHQ2 SCORE: 0

## 2025-04-03 NOTE — PROGRESS NOTES
Verbal consent was acquired by the patient to use [employer] ambient listening note generation during this visit Yes      Trevon Ziegler is a 54 y.o. female who presents for f/u  History of Present Illness  The patient is a 54-year-old established female.  She is moving back to [facility] after being in [place] for the past 12 years.  She has hyperlipidemia and her last lab order included an ApoB and a lipoprotein (a). She had a full lab panel in 10/2024, which showed a normal CBC, TSH, T4, and vitamin D, but extremely elevated LDL cholesterol of 168. She had a CT cardiac score in 10/2024 that was zero.  She would like to update labs in regards to thyroid and hormones prior to moving to [place] and will have those drawn with her APO B and lipoprotein a.    She has been undergoing biannual thyroid function tests, with the most recent one conducted in 10/2024. She is currently on estrogen patches 0.05 prescribed by her gynecologist, for over a year. She is also taking oral progesterone and vaginal estrogen. She is considering transitioning from a patch to an oral medication once her current supply of 0.05 patches is exhausted. She had previously increased her patch dosage to 0.075 but experienced heightened emotional sensitivity and irritability, prompting her to revert to the 0.05 dosage, which has improved her symptoms. Despite undergoing endometrial polyp surgery, she continues to experience spotting, which she attributes to her hormone therapy. An ultrasound was performed prior to her surgery, and a biopsy conducted a year earlier revealed no abnormalities.    She sought urgent care in 03/2025 due to recurrent facial pain that had persisted for 5 weeks and had spread to both sides of her face. The onset of this pain was approximately 1.5 days following a dental cleaning procedure. The antibiotics prescribed by the urgent care physician did not alleviate her symptoms. She attempted to manage  the pain with heat application and Advil, but these measures were ineffective. However, after taking hydroxyzine, as prescribed by this provider, she noticed an improvement in her condition the following morning. She continued to take hydroxyzine every other night for three nights, which resolved her symptoms.        Objective   /70 (BP Location: Left arm, Patient Position: Sitting, BP Cuff Size: Adult)   Pulse (!) 54   Temp 36.1 °C (97 °F)   Resp 12   Wt 63.5 kg (140 lb)   SpO2 99%   Physical Exam  Vital Signs  Gen: NAD  Resp: nonlabored.  Able to speak in full sentences  Psy: pleasant / cooperative.   Neuro:  Alert and oriented x 3      Results  Laboratory Studies  Full lab panel in 10/2024 showed normal CBC, TSH, T4, and vitamin D. LDL cholesterol was extremely elevated at 168.    Imaging  CT cardiac score in 10/2024 was zero.       Assessment & Plan  Hyperlipidemia:  Her LDL cholesterol was extremely elevated at 168 in October 2024.  Negative CT cardiac score.  Recommend APOB and lipoprotein a -if elevated, encouraged starting rosuvastatin.      2. Hormone replacement therapy.  She has been on hormone replacement therapy for over a year prescribed through her gynecologist and is currently using a patch and progesterone. She plans to switch from the patch to a daily pill once her current supply is finished.  Encouraged her to reestablish with a gynecologist that she gets to [place] which she plans on doing.  Encouraged her to have a repeat ultrasound because of her persistent spotting over the past year.  Declined a quick ultrasound prior to moving to [place].  Counseled regarding the risk versus benefit of postmenopausal hormone replacement therapy such as the potential increased risk of breast and gynecological cancers as well as blood clots and she verbalized understanding.    3.  Recurrent facial pain.  She experienced facial pain that resolved after taking hydroxyzine, indicating a possible  allergic reaction.  Treatment plan: She is advised to use hydroxyzine if the symptoms recur.                 Please note that this dictation was created using voice recognition software. I have made every reasonable attempt to correct obvious errors, but I expect that there are errors of grammar and possibly content that I did not discover before finalizing the note.

## 2025-04-04 LAB
ALBUMIN SERPL BCP-MCNC: 4.3 G/DL (ref 3.2–4.9)
ALBUMIN/GLOB SERPL: 1.5 G/DL
ALP SERPL-CCNC: 47 U/L (ref 30–99)
ALT SERPL-CCNC: 33 U/L (ref 2–50)
ANION GAP SERPL CALC-SCNC: 10 MMOL/L (ref 7–16)
AST SERPL-CCNC: 30 U/L (ref 12–45)
BILIRUB SERPL-MCNC: 0.5 MG/DL (ref 0.1–1.5)
BUN SERPL-MCNC: 16 MG/DL (ref 8–22)
CALCIUM ALBUM COR SERPL-MCNC: 9.4 MG/DL (ref 8.5–10.5)
CALCIUM SERPL-MCNC: 9.6 MG/DL (ref 8.5–10.5)
CHLORIDE SERPL-SCNC: 106 MMOL/L (ref 96–112)
CO2 SERPL-SCNC: 25 MMOL/L (ref 20–33)
CREAT SERPL-MCNC: 0.83 MG/DL (ref 0.5–1.4)
ESTRADIOL SERPL-MCNC: 99.1 PG/ML
GFR SERPLBLD CREATININE-BSD FMLA CKD-EPI: 83 ML/MIN/1.73 M 2
GLOBULIN SER CALC-MCNC: 2.9 G/DL (ref 1.9–3.5)
GLUCOSE SERPL-MCNC: 82 MG/DL (ref 65–99)
POTASSIUM SERPL-SCNC: 4.7 MMOL/L (ref 3.6–5.5)
PROGEST SERPL-MCNC: 1.7 NG/ML
PROT SERPL-MCNC: 7.2 G/DL (ref 6–8.2)
SODIUM SERPL-SCNC: 141 MMOL/L (ref 135–145)
T4 FREE SERPL-MCNC: 1.34 NG/DL (ref 0.93–1.7)
TSH SERPL-ACNC: 4.14 UIU/ML (ref 0.38–5.33)

## 2025-04-05 LAB
APO B100 SERPL-MCNC: 118 MG/DL (ref 60–117)
LPA SERPL-MCNC: 28 MG/DL

## 2025-04-06 ENCOUNTER — RESULTS FOLLOW-UP (OUTPATIENT)
Dept: MEDICAL GROUP | Facility: LAB | Age: 55
End: 2025-04-06
Payer: COMMERCIAL

## 2025-04-07 LAB
SHBG SERPL-SCNC: 79 NMOL/L (ref 17–125)
TESTOST FREE SERPL-MCNC: 1.7 PG/ML (ref 0.6–3.8)
TESTOST SERPL-MCNC: 18 NG/DL (ref 9–55)

## 2025-04-08 RX ORDER — HYDROXYZINE HYDROCHLORIDE 25 MG/1
12.5-25 TABLET, FILM COATED ORAL 3 TIMES DAILY PRN
Qty: 30 TABLET | Refills: 0 | Status: SHIPPED | OUTPATIENT
Start: 2025-04-08